# Patient Record
Sex: MALE | Race: WHITE | Employment: OTHER | URBAN - METROPOLITAN AREA
[De-identification: names, ages, dates, MRNs, and addresses within clinical notes are randomized per-mention and may not be internally consistent; named-entity substitution may affect disease eponyms.]

---

## 2017-01-12 ENCOUNTER — OFFICE VISIT (OUTPATIENT)
Dept: INTERNAL MEDICINE | Age: 67
End: 2017-01-12

## 2017-01-12 VITALS
HEART RATE: 76 BPM | BODY MASS INDEX: 35.89 KG/M2 | RESPIRATION RATE: 16 BRPM | SYSTOLIC BLOOD PRESSURE: 122 MMHG | WEIGHT: 272 LBS | DIASTOLIC BLOOD PRESSURE: 80 MMHG

## 2017-01-12 DIAGNOSIS — I10 ESSENTIAL HYPERTENSION: Primary | ICD-10-CM

## 2017-01-12 DIAGNOSIS — F41.1 GENERALIZED ANXIETY DISORDER: ICD-10-CM

## 2017-01-12 DIAGNOSIS — E11.51 TYPE 2 DIABETES MELLITUS WITH DIABETIC PERIPHERAL ANGIOPATHY WITHOUT GANGRENE, WITHOUT LONG-TERM CURRENT USE OF INSULIN (HCC): ICD-10-CM

## 2017-01-12 DIAGNOSIS — M51.26 LUMBAR HERNIATED DISC: ICD-10-CM

## 2017-01-12 DIAGNOSIS — E78.00 HYPERCHOLESTEROLEMIA: ICD-10-CM

## 2017-01-12 DIAGNOSIS — F33.41 RECURRENT MAJOR DEPRESSIVE DISORDER, IN PARTIAL REMISSION (HCC): ICD-10-CM

## 2017-01-12 LAB
A/G RATIO: 1.8 (ref 1.1–2.2)
ALBUMIN SERPL-MCNC: 4.2 G/DL (ref 3.4–5)
ALP BLD-CCNC: 120 U/L (ref 40–129)
ALT SERPL-CCNC: 9 U/L (ref 10–40)
ANION GAP SERPL CALCULATED.3IONS-SCNC: 17 MMOL/L (ref 3–16)
AST SERPL-CCNC: 8 U/L (ref 15–37)
BILIRUB SERPL-MCNC: <0.2 MG/DL (ref 0–1)
BUN BLDV-MCNC: 18 MG/DL (ref 7–20)
CALCIUM SERPL-MCNC: 9.3 MG/DL (ref 8.3–10.6)
CHLORIDE BLD-SCNC: 91 MMOL/L (ref 99–110)
CO2: 26 MMOL/L (ref 21–32)
CREAT SERPL-MCNC: 1.1 MG/DL (ref 0.8–1.3)
CREATININE URINE: 34.8 MG/DL (ref 39–259)
GFR AFRICAN AMERICAN: >60
GFR NON-AFRICAN AMERICAN: >60
GLOBULIN: 2.4 G/DL
GLUCOSE BLD-MCNC: 542 MG/DL (ref 70–99)
MICROALBUMIN UR-MCNC: <1.2 MG/DL
MICROALBUMIN/CREAT UR-RTO: ABNORMAL MG/G (ref 0–30)
POTASSIUM SERPL-SCNC: 4.4 MMOL/L (ref 3.5–5.1)
SODIUM BLD-SCNC: 134 MMOL/L (ref 136–145)
TOTAL PROTEIN: 6.6 G/DL (ref 6.4–8.2)

## 2017-01-12 PROCEDURE — 4040F PNEUMOC VAC/ADMIN/RCVD: CPT | Performed by: INTERNAL MEDICINE

## 2017-01-12 PROCEDURE — G8484 FLU IMMUNIZE NO ADMIN: HCPCS | Performed by: INTERNAL MEDICINE

## 2017-01-12 PROCEDURE — 3046F HEMOGLOBIN A1C LEVEL >9.0%: CPT | Performed by: INTERNAL MEDICINE

## 2017-01-12 PROCEDURE — 3017F COLORECTAL CA SCREEN DOC REV: CPT | Performed by: INTERNAL MEDICINE

## 2017-01-12 PROCEDURE — 99214 OFFICE O/P EST MOD 30 MIN: CPT | Performed by: INTERNAL MEDICINE

## 2017-01-12 PROCEDURE — 1123F ACP DISCUSS/DSCN MKR DOCD: CPT | Performed by: INTERNAL MEDICINE

## 2017-01-12 PROCEDURE — G8427 DOCREV CUR MEDS BY ELIG CLIN: HCPCS | Performed by: INTERNAL MEDICINE

## 2017-01-12 PROCEDURE — 36415 COLL VENOUS BLD VENIPUNCTURE: CPT | Performed by: INTERNAL MEDICINE

## 2017-01-12 PROCEDURE — 1036F TOBACCO NON-USER: CPT | Performed by: INTERNAL MEDICINE

## 2017-01-12 PROCEDURE — G8419 CALC BMI OUT NRM PARAM NOF/U: HCPCS | Performed by: INTERNAL MEDICINE

## 2017-01-12 ASSESSMENT — ENCOUNTER SYMPTOMS
GASTROINTESTINAL NEGATIVE: 1
RESPIRATORY NEGATIVE: 1
BACK PAIN: 1

## 2017-01-14 LAB
ESTIMATED AVERAGE GLUCOSE: 380.9 MG/DL
HBA1C MFR BLD: 14.9 %

## 2017-01-15 LAB
6-ACETYLMORPHINE: NOT DETECTED
7-AMINOCLONAZEPAM, URINE: <5 NG/ML
7-AMINOCLONAZEPAM: NOT DETECTED
ALPHA-HYDROXYALPRAZOLAM, URINE: <5 NG/ML
ALPHA-HYDROXYMIDAZOLAM, URINE: <20 NG/ML
ALPHA-OH-ALPRAZOLAM: NOT DETECTED
ALPRAZOLAM, URINE: <5 NG/ML
ALPRAZOLAM: NOT DETECTED
AMPHETAMINE: NOT DETECTED
BARBITURATES: NOT DETECTED
BENZOYLECGONINE: NOT DETECTED
BUPRENORPHINE: NOT DETECTED
CARISOPRODOL: NOT DETECTED
CHLORDIAZEPOXIDE, URINE: <20 NG/ML
CLONAZEPAM, URINE: <5 NG/ML
CLONAZEPAM: NOT DETECTED
CODEINE: NOT DETECTED
CREATININE URINE: 34.8 MG/DL (ref 20–400)
DIAZEPAM, URINE: <20 NG/ML
DIAZEPAM: NOT DETECTED
EER PAIN MGT DRUG PANEL, HIGH RES/EMIT U: NORMAL
ETHYL GLUCURONIDE: NOT DETECTED
FENTANYL: NOT DETECTED
HYDROCODONE: PRESENT
HYDROMORPHONE: NOT DETECTED
LORAZEPAM, URINE: <20 NG/ML
LORAZEPAM: NOT DETECTED
MARIJUANA METABOLITE: NOT DETECTED
MDA: NOT DETECTED
MDEA: NOT DETECTED
MDMA URINE: NOT DETECTED
MEPERIDINE: NOT DETECTED
METHADONE: NOT DETECTED
METHAMPHETAMINE: NOT DETECTED
METHYLPHENIDATE: NOT DETECTED
MIDAZOLAM, URINE: <20 NG/ML
MIDAZOLAM: NOT DETECTED
MORPHINE: PRESENT
NORBUPRENORPHINE, FREE: NOT DETECTED
NORDIAZEPAM, URINE: 281 NG/ML
NORDIAZEPAM: PRESENT
NORFENTANYL: NOT DETECTED
NORHYDROCODONE, URINE: PRESENT
NOROXYCODONE: NOT DETECTED
NOROXYMORPHONE, URINE: NOT DETECTED
OXAZEPAM, URINE: 670 NG/ML
OXAZEPAM: PRESENT
OXYCODONE: NOT DETECTED
OXYMORPHONE: NOT DETECTED
PAIN MANAGEMENT DRUG PANEL: NORMAL
PCP: NOT DETECTED
PHENTERMINE: NOT DETECTED
PROPOXYPHENE: NOT DETECTED
TAPENTADOL, URINE: NOT DETECTED
TAPENTADOL-O-SULFATE, URINE: NOT DETECTED
TEMAZEPAM, URINE: 516 NG/ML
TEMAZEPAM: PRESENT
TRAMADOL: NOT DETECTED
ZOLPIDEM: NOT DETECTED

## 2017-01-16 LAB
6AM URINE: <10 NG/ML
CODEINE, URINE: <20 NG/ML
HYDROCODONE, URINE: 567 NG/ML
HYDROMORPHONE, URINE: <20 NG/ML
MORPHINE URINE: <20 NG/ML
NORHYDROCODONE, URINE: 817 NG/ML
NOROXYCODONE, URINE: <20 NG/ML
NOROXYMORPHONE, URINE: <20 NG/ML
OXYCODONE, URINE CONFIRMATION: <20 NG/ML
OXYMORPHONE, URINE: <20 NG/ML

## 2017-01-24 RX ORDER — DIAZEPAM 10 MG/1
10 TABLET ORAL EVERY 6 HOURS PRN
Qty: 120 TABLET | Refills: 0 | Status: SHIPPED | OUTPATIENT
Start: 2017-01-24 | End: 2017-02-21 | Stop reason: SDUPTHER

## 2017-01-24 RX ORDER — MORPHINE SULFATE 15 MG/1
15 TABLET ORAL EVERY 8 HOURS PRN
Qty: 60 TABLET | Refills: 0 | Status: SHIPPED | OUTPATIENT
Start: 2017-01-24 | End: 2017-02-21 | Stop reason: SDUPTHER

## 2017-01-24 RX ORDER — HYDROCODONE BITARTRATE AND ACETAMINOPHEN 10; 325 MG/1; MG/1
1 TABLET ORAL EVERY 4 HOURS PRN
Qty: 180 TABLET | Refills: 0 | Status: SHIPPED | OUTPATIENT
Start: 2017-01-24 | End: 2017-02-21 | Stop reason: SDUPTHER

## 2017-02-21 RX ORDER — MORPHINE SULFATE 15 MG/1
15 TABLET ORAL EVERY 8 HOURS PRN
Qty: 60 TABLET | Refills: 0 | Status: SHIPPED | OUTPATIENT
Start: 2017-02-21 | End: 2017-03-22 | Stop reason: SDUPTHER

## 2017-02-21 RX ORDER — HYDROCODONE BITARTRATE AND ACETAMINOPHEN 10; 325 MG/1; MG/1
1 TABLET ORAL EVERY 4 HOURS PRN
Qty: 180 TABLET | Refills: 0 | Status: SHIPPED | OUTPATIENT
Start: 2017-02-21 | End: 2017-03-22 | Stop reason: SDUPTHER

## 2017-02-21 RX ORDER — DIAZEPAM 10 MG/1
10 TABLET ORAL EVERY 6 HOURS PRN
Qty: 120 TABLET | Refills: 0 | Status: SHIPPED | OUTPATIENT
Start: 2017-02-21 | End: 2017-03-22 | Stop reason: SDUPTHER

## 2017-03-22 ENCOUNTER — OFFICE VISIT (OUTPATIENT)
Dept: INTERNAL MEDICINE | Age: 67
End: 2017-03-22

## 2017-03-22 VITALS
HEART RATE: 84 BPM | WEIGHT: 266 LBS | SYSTOLIC BLOOD PRESSURE: 100 MMHG | BODY MASS INDEX: 35.09 KG/M2 | RESPIRATION RATE: 16 BRPM | DIASTOLIC BLOOD PRESSURE: 60 MMHG

## 2017-03-22 DIAGNOSIS — G63 POLYNEUROPATHY ASSOCIATED WITH UNDERLYING DISEASE (HCC): Primary | ICD-10-CM

## 2017-03-22 DIAGNOSIS — E11.51 TYPE 2 DIABETES MELLITUS WITH DIABETIC PERIPHERAL ANGIOPATHY WITHOUT GANGRENE, WITHOUT LONG-TERM CURRENT USE OF INSULIN (HCC): ICD-10-CM

## 2017-03-22 PROCEDURE — 4040F PNEUMOC VAC/ADMIN/RCVD: CPT | Performed by: INTERNAL MEDICINE

## 2017-03-22 PROCEDURE — G8417 CALC BMI ABV UP PARAM F/U: HCPCS | Performed by: INTERNAL MEDICINE

## 2017-03-22 PROCEDURE — 1036F TOBACCO NON-USER: CPT | Performed by: INTERNAL MEDICINE

## 2017-03-22 PROCEDURE — 3046F HEMOGLOBIN A1C LEVEL >9.0%: CPT | Performed by: INTERNAL MEDICINE

## 2017-03-22 PROCEDURE — 3017F COLORECTAL CA SCREEN DOC REV: CPT | Performed by: INTERNAL MEDICINE

## 2017-03-22 PROCEDURE — 1123F ACP DISCUSS/DSCN MKR DOCD: CPT | Performed by: INTERNAL MEDICINE

## 2017-03-22 PROCEDURE — G8427 DOCREV CUR MEDS BY ELIG CLIN: HCPCS | Performed by: INTERNAL MEDICINE

## 2017-03-22 PROCEDURE — G8484 FLU IMMUNIZE NO ADMIN: HCPCS | Performed by: INTERNAL MEDICINE

## 2017-03-22 PROCEDURE — 99214 OFFICE O/P EST MOD 30 MIN: CPT | Performed by: INTERNAL MEDICINE

## 2017-03-22 RX ORDER — HYDROCODONE BITARTRATE AND ACETAMINOPHEN 10; 325 MG/1; MG/1
1 TABLET ORAL EVERY 4 HOURS PRN
Qty: 180 TABLET | Refills: 0 | Status: SHIPPED | OUTPATIENT
Start: 2017-03-22 | End: 2017-04-24 | Stop reason: SDUPTHER

## 2017-03-22 RX ORDER — MORPHINE SULFATE 15 MG/1
15 TABLET ORAL EVERY 8 HOURS PRN
Qty: 60 TABLET | Refills: 0 | Status: SHIPPED | OUTPATIENT
Start: 2017-03-22 | End: 2017-04-24 | Stop reason: SDUPTHER

## 2017-03-22 RX ORDER — GABAPENTIN 300 MG/1
300 CAPSULE ORAL 3 TIMES DAILY
Qty: 90 CAPSULE | Refills: 3 | Status: SHIPPED | OUTPATIENT
Start: 2017-03-22 | End: 2017-07-08 | Stop reason: SDUPTHER

## 2017-03-22 RX ORDER — TRAZODONE HYDROCHLORIDE 100 MG/1
TABLET ORAL
Qty: 120 TABLET | Refills: 0 | Status: SHIPPED | OUTPATIENT
Start: 2017-03-22 | End: 2017-04-24 | Stop reason: SDUPTHER

## 2017-03-22 RX ORDER — DIAZEPAM 10 MG/1
10 TABLET ORAL EVERY 6 HOURS PRN
Qty: 120 TABLET | Refills: 0 | Status: SHIPPED | OUTPATIENT
Start: 2017-03-22 | End: 2017-04-24 | Stop reason: SDUPTHER

## 2017-03-22 ASSESSMENT — ENCOUNTER SYMPTOMS
BACK PAIN: 1
GASTROINTESTINAL NEGATIVE: 1
RESPIRATORY NEGATIVE: 1

## 2017-03-27 ENCOUNTER — TELEPHONE (OUTPATIENT)
Dept: INTERNAL MEDICINE | Age: 67
End: 2017-03-27

## 2017-04-25 ENCOUNTER — TELEPHONE (OUTPATIENT)
Dept: INTERNAL MEDICINE | Age: 67
End: 2017-04-25

## 2017-04-25 RX ORDER — MORPHINE SULFATE 15 MG/1
15 TABLET ORAL EVERY 8 HOURS PRN
Qty: 60 TABLET | Refills: 0 | Status: SHIPPED | OUTPATIENT
Start: 2017-04-25 | End: 2017-05-22 | Stop reason: SDUPTHER

## 2017-04-25 RX ORDER — TRAZODONE HYDROCHLORIDE 100 MG/1
TABLET ORAL
Qty: 120 TABLET | Refills: 0 | Status: SHIPPED | OUTPATIENT
Start: 2017-04-25 | End: 2017-06-08 | Stop reason: SDUPTHER

## 2017-04-25 RX ORDER — DIAZEPAM 10 MG/1
10 TABLET ORAL EVERY 6 HOURS PRN
Qty: 120 TABLET | Refills: 0 | Status: SHIPPED | OUTPATIENT
Start: 2017-04-25 | End: 2017-05-22 | Stop reason: SDUPTHER

## 2017-04-25 RX ORDER — HYDROCODONE BITARTRATE AND ACETAMINOPHEN 10; 325 MG/1; MG/1
1 TABLET ORAL EVERY 4 HOURS PRN
Qty: 180 TABLET | Refills: 0 | Status: SHIPPED | OUTPATIENT
Start: 2017-04-25 | End: 2017-05-22 | Stop reason: SDUPTHER

## 2017-05-08 ENCOUNTER — TELEPHONE (OUTPATIENT)
Dept: INTERNAL MEDICINE | Age: 67
End: 2017-05-08

## 2017-05-17 ENCOUNTER — OFFICE VISIT (OUTPATIENT)
Dept: INTERNAL MEDICINE | Age: 67
End: 2017-05-17

## 2017-05-17 VITALS
DIASTOLIC BLOOD PRESSURE: 58 MMHG | RESPIRATION RATE: 16 BRPM | WEIGHT: 269 LBS | BODY MASS INDEX: 35.49 KG/M2 | HEART RATE: 88 BPM | SYSTOLIC BLOOD PRESSURE: 102 MMHG

## 2017-05-17 DIAGNOSIS — R29.898 RIGHT LEG WEAKNESS: Primary | ICD-10-CM

## 2017-05-17 PROCEDURE — 4040F PNEUMOC VAC/ADMIN/RCVD: CPT | Performed by: INTERNAL MEDICINE

## 2017-05-17 PROCEDURE — 1036F TOBACCO NON-USER: CPT | Performed by: INTERNAL MEDICINE

## 2017-05-17 PROCEDURE — G8417 CALC BMI ABV UP PARAM F/U: HCPCS | Performed by: INTERNAL MEDICINE

## 2017-05-17 PROCEDURE — 3017F COLORECTAL CA SCREEN DOC REV: CPT | Performed by: INTERNAL MEDICINE

## 2017-05-17 PROCEDURE — 99214 OFFICE O/P EST MOD 30 MIN: CPT | Performed by: INTERNAL MEDICINE

## 2017-05-17 PROCEDURE — G8427 DOCREV CUR MEDS BY ELIG CLIN: HCPCS | Performed by: INTERNAL MEDICINE

## 2017-05-17 PROCEDURE — 1123F ACP DISCUSS/DSCN MKR DOCD: CPT | Performed by: INTERNAL MEDICINE

## 2017-05-17 RX ORDER — ALPRAZOLAM 1 MG/1
TABLET ORAL
Qty: 3 TABLET | Refills: 0 | Status: SHIPPED | OUTPATIENT
Start: 2017-05-17 | End: 2017-05-23 | Stop reason: SDUPTHER

## 2017-05-17 ASSESSMENT — ENCOUNTER SYMPTOMS
RESPIRATORY NEGATIVE: 1
BACK PAIN: 1
GASTROINTESTINAL NEGATIVE: 1

## 2017-05-22 ENCOUNTER — TELEPHONE (OUTPATIENT)
Dept: INTERNAL MEDICINE | Age: 67
End: 2017-05-22

## 2017-05-23 RX ORDER — MORPHINE SULFATE 15 MG/1
15 TABLET ORAL EVERY 8 HOURS PRN
Qty: 60 TABLET | Refills: 0 | Status: SHIPPED | OUTPATIENT
Start: 2017-05-23 | End: 2017-06-21 | Stop reason: SDUPTHER

## 2017-05-23 RX ORDER — ALPRAZOLAM 1 MG/1
TABLET ORAL
Qty: 3 TABLET | Refills: 0 | OUTPATIENT
Start: 2017-05-23 | End: 2017-07-06 | Stop reason: ALTCHOICE

## 2017-05-23 RX ORDER — HYDROCODONE BITARTRATE AND ACETAMINOPHEN 10; 325 MG/1; MG/1
1 TABLET ORAL EVERY 4 HOURS PRN
Qty: 180 TABLET | Refills: 0 | Status: SHIPPED | OUTPATIENT
Start: 2017-05-23 | End: 2017-06-21 | Stop reason: SDUPTHER

## 2017-05-23 RX ORDER — DIAZEPAM 10 MG/1
10 TABLET ORAL EVERY 6 HOURS PRN
Qty: 120 TABLET | Refills: 0 | Status: SHIPPED | OUTPATIENT
Start: 2017-05-23 | End: 2017-06-21 | Stop reason: SDUPTHER

## 2017-06-06 ENCOUNTER — OFFICE VISIT (OUTPATIENT)
Dept: INTERNAL MEDICINE | Age: 67
End: 2017-06-06

## 2017-06-06 VITALS
RESPIRATION RATE: 18 BRPM | DIASTOLIC BLOOD PRESSURE: 80 MMHG | BODY MASS INDEX: 34.7 KG/M2 | SYSTOLIC BLOOD PRESSURE: 138 MMHG | HEART RATE: 76 BPM | WEIGHT: 263 LBS

## 2017-06-06 DIAGNOSIS — R29.898 RIGHT LEG WEAKNESS: ICD-10-CM

## 2017-06-06 DIAGNOSIS — E78.00 HYPERCHOLESTEROLEMIA: ICD-10-CM

## 2017-06-06 DIAGNOSIS — E11.51 TYPE 2 DIABETES MELLITUS WITH DIABETIC PERIPHERAL ANGIOPATHY WITHOUT GANGRENE, WITHOUT LONG-TERM CURRENT USE OF INSULIN (HCC): Primary | ICD-10-CM

## 2017-06-06 PROCEDURE — 3046F HEMOGLOBIN A1C LEVEL >9.0%: CPT | Performed by: INTERNAL MEDICINE

## 2017-06-06 PROCEDURE — 99214 OFFICE O/P EST MOD 30 MIN: CPT | Performed by: INTERNAL MEDICINE

## 2017-06-06 PROCEDURE — 1036F TOBACCO NON-USER: CPT | Performed by: INTERNAL MEDICINE

## 2017-06-06 PROCEDURE — G8427 DOCREV CUR MEDS BY ELIG CLIN: HCPCS | Performed by: INTERNAL MEDICINE

## 2017-06-06 PROCEDURE — 4040F PNEUMOC VAC/ADMIN/RCVD: CPT | Performed by: INTERNAL MEDICINE

## 2017-06-06 PROCEDURE — 3017F COLORECTAL CA SCREEN DOC REV: CPT | Performed by: INTERNAL MEDICINE

## 2017-06-06 PROCEDURE — G8417 CALC BMI ABV UP PARAM F/U: HCPCS | Performed by: INTERNAL MEDICINE

## 2017-06-06 PROCEDURE — 1123F ACP DISCUSS/DSCN MKR DOCD: CPT | Performed by: INTERNAL MEDICINE

## 2017-06-06 RX ORDER — ROSUVASTATIN CALCIUM 20 MG/1
20 TABLET, COATED ORAL DAILY
Qty: 30 TABLET | Refills: 3 | Status: SHIPPED | OUTPATIENT
Start: 2017-06-06 | End: 2017-06-08 | Stop reason: ALTCHOICE

## 2017-06-06 ASSESSMENT — ENCOUNTER SYMPTOMS
RESPIRATORY NEGATIVE: 1
GASTROINTESTINAL NEGATIVE: 1
BACK PAIN: 1

## 2017-06-08 RX ORDER — LISINOPRIL AND HYDROCHLOROTHIAZIDE 20; 12.5 MG/1; MG/1
TABLET ORAL
Qty: 60 TABLET | Refills: 5 | Status: SHIPPED | OUTPATIENT
Start: 2017-06-08 | End: 2017-07-06 | Stop reason: SDUPTHER

## 2017-06-08 RX ORDER — CLONIDINE HYDROCHLORIDE 0.1 MG/1
TABLET ORAL
Qty: 60 TABLET | Refills: 5 | Status: ON HOLD | OUTPATIENT
Start: 2017-06-08 | End: 2017-10-21 | Stop reason: HOSPADM

## 2017-06-08 RX ORDER — AMOXICILLIN 500 MG/1
CAPSULE ORAL
Qty: 60 CAPSULE | Refills: 5 | Status: SHIPPED | OUTPATIENT
Start: 2017-06-08 | End: 2017-11-21 | Stop reason: SDUPTHER

## 2017-06-09 ENCOUNTER — TELEPHONE (OUTPATIENT)
Dept: INTERNAL MEDICINE | Age: 67
End: 2017-06-09

## 2017-06-09 DIAGNOSIS — M50.20 HERNIATED CERVICAL DISC: ICD-10-CM

## 2017-06-09 DIAGNOSIS — M51.26 LUMBAR HERNIATED DISC: Primary | ICD-10-CM

## 2017-06-12 ENCOUNTER — TELEPHONE (OUTPATIENT)
Dept: INTERNAL MEDICINE | Age: 67
End: 2017-06-12

## 2017-06-20 ENCOUNTER — HOSPITAL ENCOUNTER (OUTPATIENT)
Dept: PHYSICAL THERAPY | Age: 67
Discharge: HOME | End: 2017-06-30
Admitting: INTERNAL MEDICINE

## 2017-06-23 RX ORDER — MORPHINE SULFATE 15 MG/1
15 TABLET ORAL EVERY 8 HOURS PRN
Qty: 60 TABLET | Refills: 0 | Status: SHIPPED | OUTPATIENT
Start: 2017-06-23 | End: 2017-07-24 | Stop reason: SDUPTHER

## 2017-06-23 RX ORDER — DIAZEPAM 10 MG/1
10 TABLET ORAL EVERY 6 HOURS PRN
Qty: 120 TABLET | Refills: 0 | Status: SHIPPED | OUTPATIENT
Start: 2017-06-23 | End: 2017-07-24 | Stop reason: SDUPTHER

## 2017-06-23 RX ORDER — HYDROCODONE BITARTRATE AND ACETAMINOPHEN 10; 325 MG/1; MG/1
1 TABLET ORAL EVERY 4 HOURS PRN
Qty: 180 TABLET | Refills: 0 | Status: SHIPPED | OUTPATIENT
Start: 2017-06-23 | End: 2017-07-24 | Stop reason: SDUPTHER

## 2017-06-23 RX ORDER — TRAZODONE HYDROCHLORIDE 100 MG/1
TABLET ORAL
Qty: 120 TABLET | Refills: 5 | Status: SHIPPED | OUTPATIENT
Start: 2017-06-23 | End: 2017-07-24 | Stop reason: SDUPTHER

## 2017-07-06 ENCOUNTER — OFFICE VISIT (OUTPATIENT)
Dept: INTERNAL MEDICINE | Age: 67
End: 2017-07-06

## 2017-07-06 VITALS
WEIGHT: 257 LBS | RESPIRATION RATE: 16 BRPM | SYSTOLIC BLOOD PRESSURE: 122 MMHG | HEART RATE: 72 BPM | DIASTOLIC BLOOD PRESSURE: 68 MMHG | BODY MASS INDEX: 33.91 KG/M2

## 2017-07-06 DIAGNOSIS — E11.51 TYPE 2 DIABETES MELLITUS WITH DIABETIC PERIPHERAL ANGIOPATHY WITHOUT GANGRENE, WITHOUT LONG-TERM CURRENT USE OF INSULIN (HCC): ICD-10-CM

## 2017-07-06 DIAGNOSIS — E78.00 HYPERCHOLESTEROLEMIA: Primary | ICD-10-CM

## 2017-07-06 DIAGNOSIS — R29.898 RIGHT LEG WEAKNESS: ICD-10-CM

## 2017-07-06 LAB
A/G RATIO: 1.7 (ref 1.1–2.2)
ALBUMIN SERPL-MCNC: 4.2 G/DL (ref 3.4–5)
ALP BLD-CCNC: 70 U/L (ref 40–129)
ALT SERPL-CCNC: 13 U/L (ref 10–40)
ANION GAP SERPL CALCULATED.3IONS-SCNC: 16 MMOL/L (ref 3–16)
AST SERPL-CCNC: 10 U/L (ref 15–37)
BILIRUB SERPL-MCNC: 0.3 MG/DL (ref 0–1)
BUN BLDV-MCNC: 16 MG/DL (ref 7–20)
CALCIUM SERPL-MCNC: 9.9 MG/DL (ref 8.3–10.6)
CHLORIDE BLD-SCNC: 101 MMOL/L (ref 99–110)
CHOLESTEROL, TOTAL: 176 MG/DL (ref 0–199)
CO2: 27 MMOL/L (ref 21–32)
CREAT SERPL-MCNC: 0.8 MG/DL (ref 0.8–1.3)
GFR AFRICAN AMERICAN: >60
GFR NON-AFRICAN AMERICAN: >60
GLOBULIN: 2.5 G/DL
GLUCOSE BLD-MCNC: 144 MG/DL (ref 70–99)
HDLC SERPL-MCNC: 32 MG/DL (ref 40–60)
LDL CHOLESTEROL CALCULATED: 94 MG/DL
POTASSIUM SERPL-SCNC: 4.1 MMOL/L (ref 3.5–5.1)
SODIUM BLD-SCNC: 144 MMOL/L (ref 136–145)
TOTAL PROTEIN: 6.7 G/DL (ref 6.4–8.2)
TRIGL SERPL-MCNC: 250 MG/DL (ref 0–150)
VLDLC SERPL CALC-MCNC: 50 MG/DL

## 2017-07-06 PROCEDURE — G8427 DOCREV CUR MEDS BY ELIG CLIN: HCPCS | Performed by: INTERNAL MEDICINE

## 2017-07-06 PROCEDURE — 99214 OFFICE O/P EST MOD 30 MIN: CPT | Performed by: INTERNAL MEDICINE

## 2017-07-06 PROCEDURE — 3017F COLORECTAL CA SCREEN DOC REV: CPT | Performed by: INTERNAL MEDICINE

## 2017-07-06 PROCEDURE — 4040F PNEUMOC VAC/ADMIN/RCVD: CPT | Performed by: INTERNAL MEDICINE

## 2017-07-06 PROCEDURE — 1123F ACP DISCUSS/DSCN MKR DOCD: CPT | Performed by: INTERNAL MEDICINE

## 2017-07-06 PROCEDURE — 1036F TOBACCO NON-USER: CPT | Performed by: INTERNAL MEDICINE

## 2017-07-06 PROCEDURE — 3046F HEMOGLOBIN A1C LEVEL >9.0%: CPT | Performed by: INTERNAL MEDICINE

## 2017-07-06 PROCEDURE — 36415 COLL VENOUS BLD VENIPUNCTURE: CPT | Performed by: INTERNAL MEDICINE

## 2017-07-06 PROCEDURE — G8417 CALC BMI ABV UP PARAM F/U: HCPCS | Performed by: INTERNAL MEDICINE

## 2017-07-06 ASSESSMENT — ENCOUNTER SYMPTOMS
BACK PAIN: 1
GASTROINTESTINAL NEGATIVE: 1
RESPIRATORY NEGATIVE: 1

## 2017-07-07 ENCOUNTER — TELEPHONE (OUTPATIENT)
Dept: INTERNAL MEDICINE | Age: 67
End: 2017-07-07

## 2017-07-07 LAB
ESTIMATED AVERAGE GLUCOSE: 197.3 MG/DL
HBA1C MFR BLD: 8.5 %

## 2017-07-18 RX ORDER — MORPHINE SULFATE 15 MG/1
15 TABLET ORAL EVERY 8 HOURS PRN
Qty: 60 TABLET | Refills: 0 | OUTPATIENT
Start: 2017-07-18

## 2017-07-18 RX ORDER — DIAZEPAM 10 MG/1
10 TABLET ORAL EVERY 6 HOURS PRN
Qty: 120 TABLET | Refills: 0 | OUTPATIENT
Start: 2017-07-18

## 2017-07-18 RX ORDER — TRAZODONE HYDROCHLORIDE 100 MG/1
TABLET ORAL
Qty: 120 TABLET | Refills: 0 | OUTPATIENT
Start: 2017-07-18

## 2017-07-18 RX ORDER — HYDROCODONE BITARTRATE AND ACETAMINOPHEN 10; 325 MG/1; MG/1
1 TABLET ORAL EVERY 4 HOURS PRN
Qty: 180 TABLET | Refills: 0 | OUTPATIENT
Start: 2017-07-18

## 2017-07-24 DIAGNOSIS — M51.26 LUMBAR HERNIATED DISC: Primary | ICD-10-CM

## 2017-07-24 DIAGNOSIS — M50.20 HERNIATED CERVICAL DISC: ICD-10-CM

## 2017-07-24 RX ORDER — TRAZODONE HYDROCHLORIDE 100 MG/1
TABLET ORAL
Qty: 120 TABLET | Refills: 1 | Status: ON HOLD | OUTPATIENT
Start: 2017-07-24 | End: 2017-10-21 | Stop reason: HOSPADM

## 2017-07-24 RX ORDER — MORPHINE SULFATE 15 MG/1
15 TABLET ORAL EVERY 8 HOURS PRN
Qty: 60 TABLET | Refills: 0 | Status: SHIPPED | OUTPATIENT
Start: 2017-07-24 | End: 2017-08-17 | Stop reason: DRUGHIGH

## 2017-07-24 RX ORDER — HYDROCODONE BITARTRATE AND ACETAMINOPHEN 10; 325 MG/1; MG/1
1 TABLET ORAL EVERY 4 HOURS PRN
Qty: 180 TABLET | Refills: 0 | Status: SHIPPED | OUTPATIENT
Start: 2017-07-24 | End: 2017-08-17 | Stop reason: DRUGHIGH

## 2017-07-24 RX ORDER — DIAZEPAM 10 MG/1
10 TABLET ORAL EVERY 6 HOURS PRN
Qty: 120 TABLET | Refills: 0 | Status: ON HOLD | OUTPATIENT
Start: 2017-07-24 | End: 2017-08-30 | Stop reason: SDUPTHER

## 2017-08-07 ENCOUNTER — TELEPHONE (OUTPATIENT)
Dept: PAIN MANAGEMENT | Age: 67
End: 2017-08-07

## 2017-08-09 ENCOUNTER — TELEPHONE (OUTPATIENT)
Dept: INTERNAL MEDICINE | Age: 67
End: 2017-08-09

## 2017-08-17 ENCOUNTER — TELEPHONE (OUTPATIENT)
Dept: INTERNAL MEDICINE | Age: 67
End: 2017-08-17

## 2017-08-17 RX ORDER — HYDROCODONE BITARTRATE AND ACETAMINOPHEN 10; 325 MG/1; MG/1
1 TABLET ORAL EVERY 4 HOURS PRN
Status: ON HOLD | COMMUNITY
End: 2017-10-21

## 2017-08-17 RX ORDER — MORPHINE SULFATE 15 MG/1
15 TABLET ORAL EVERY 12 HOURS PRN
Status: ON HOLD | COMMUNITY
End: 2017-10-21

## 2017-08-30 PROBLEM — M54.9 BACK PAIN: Status: ACTIVE | Noted: 2017-08-30

## 2017-08-30 PROBLEM — E11.9 DM (DIABETES MELLITUS) (HCC): Status: ACTIVE | Noted: 2017-08-30

## 2017-08-31 PROBLEM — R55 NEAR SYNCOPE: Status: ACTIVE | Noted: 2017-08-31

## 2017-08-31 PROBLEM — R77.8 ELEVATED TROPONIN I LEVEL: Status: ACTIVE | Noted: 2017-08-31

## 2017-09-02 ENCOUNTER — CARE COORDINATION (OUTPATIENT)
Dept: CASE MANAGEMENT | Age: 67
End: 2017-09-02

## 2017-09-05 ENCOUNTER — TELEPHONE (OUTPATIENT)
Dept: INTERNAL MEDICINE | Age: 67
End: 2017-09-05

## 2017-09-05 RX ORDER — DIAZEPAM 10 MG/1
TABLET ORAL
Qty: 60 TABLET | Refills: 0 | OUTPATIENT
Start: 2017-09-05 | End: 2017-09-19 | Stop reason: SDUPTHER

## 2017-09-08 ENCOUNTER — CARE COORDINATION (OUTPATIENT)
Dept: CASE MANAGEMENT | Age: 67
End: 2017-09-08

## 2017-09-19 ENCOUNTER — OFFICE VISIT (OUTPATIENT)
Dept: INTERNAL MEDICINE | Age: 67
End: 2017-09-19

## 2017-09-19 VITALS
OXYGEN SATURATION: 98 % | BODY MASS INDEX: 36.46 KG/M2 | DIASTOLIC BLOOD PRESSURE: 68 MMHG | HEART RATE: 76 BPM | HEIGHT: 72 IN | SYSTOLIC BLOOD PRESSURE: 124 MMHG | WEIGHT: 269.2 LBS

## 2017-09-19 DIAGNOSIS — Z12.12 SCREENING FOR COLORECTAL CANCER: ICD-10-CM

## 2017-09-19 DIAGNOSIS — I48.91 ATRIAL FIBRILLATION WITH RVR (HCC): ICD-10-CM

## 2017-09-19 DIAGNOSIS — I10 ESSENTIAL HYPERTENSION: ICD-10-CM

## 2017-09-19 DIAGNOSIS — Z12.5 SCREENING FOR PROSTATE CANCER: ICD-10-CM

## 2017-09-19 DIAGNOSIS — I67.82 TEMPORARY CEREBRAL VASCULAR DYSFUNCTION: ICD-10-CM

## 2017-09-19 DIAGNOSIS — F41.1 GENERALIZED ANXIETY DISORDER: ICD-10-CM

## 2017-09-19 DIAGNOSIS — Z00.00 ROUTINE GENERAL MEDICAL EXAMINATION AT A HEALTH CARE FACILITY: ICD-10-CM

## 2017-09-19 DIAGNOSIS — Z13.1 SCREENING FOR DIABETES MELLITUS (DM): ICD-10-CM

## 2017-09-19 DIAGNOSIS — F33.41 RECURRENT MAJOR DEPRESSIVE DISORDER, IN PARTIAL REMISSION (HCC): ICD-10-CM

## 2017-09-19 DIAGNOSIS — E78.00 HYPERCHOLESTEROLEMIA: ICD-10-CM

## 2017-09-19 DIAGNOSIS — E11.9 TYPE 2 DIABETES MELLITUS WITHOUT COMPLICATION, WITHOUT LONG-TERM CURRENT USE OF INSULIN (HCC): ICD-10-CM

## 2017-09-19 DIAGNOSIS — E11.51 TYPE 2 DIABETES MELLITUS WITH DIABETIC PERIPHERAL ANGIOPATHY WITHOUT GANGRENE, WITHOUT LONG-TERM CURRENT USE OF INSULIN (HCC): ICD-10-CM

## 2017-09-19 DIAGNOSIS — M54.5 CHRONIC BILATERAL LOW BACK PAIN, WITH SCIATICA PRESENCE UNSPECIFIED: ICD-10-CM

## 2017-09-19 DIAGNOSIS — I25.10 CORONARY ARTERY DISEASE INVOLVING NATIVE CORONARY ARTERY OF NATIVE HEART WITHOUT ANGINA PECTORIS: ICD-10-CM

## 2017-09-19 DIAGNOSIS — Z00.00 ROUTINE GENERAL MEDICAL EXAMINATION AT A HEALTH CARE FACILITY: Primary | ICD-10-CM

## 2017-09-19 DIAGNOSIS — G89.29 CHRONIC BILATERAL LOW BACK PAIN, WITH SCIATICA PRESENCE UNSPECIFIED: ICD-10-CM

## 2017-09-19 DIAGNOSIS — Z13.6 SCREENING FOR CARDIOVASCULAR CONDITION: ICD-10-CM

## 2017-09-19 DIAGNOSIS — Z72.89 OTHER PROBLEMS RELATED TO LIFESTYLE: ICD-10-CM

## 2017-09-19 DIAGNOSIS — Z12.11 SCREENING FOR COLORECTAL CANCER: ICD-10-CM

## 2017-09-19 PROBLEM — M54.9 BACK PAIN: Status: RESOLVED | Noted: 2017-08-30 | Resolved: 2017-09-19

## 2017-09-19 PROBLEM — R77.8 ELEVATED TROPONIN: Status: RESOLVED | Noted: 2017-08-31 | Resolved: 2017-09-19

## 2017-09-19 LAB
A/G RATIO: 1.8 (ref 1.1–2.2)
ALBUMIN SERPL-MCNC: 4.2 G/DL (ref 3.4–5)
ALP BLD-CCNC: 78 U/L (ref 40–129)
ALT SERPL-CCNC: 16 U/L (ref 10–40)
ANION GAP SERPL CALCULATED.3IONS-SCNC: 12 MMOL/L (ref 3–16)
AST SERPL-CCNC: 11 U/L (ref 15–37)
BILIRUB SERPL-MCNC: <0.2 MG/DL (ref 0–1)
BILIRUBIN URINE: NEGATIVE
BLOOD, URINE: NEGATIVE
BUN BLDV-MCNC: 16 MG/DL (ref 7–20)
CALCIUM SERPL-MCNC: 10.1 MG/DL (ref 8.3–10.6)
CHLORIDE BLD-SCNC: 100 MMOL/L (ref 99–110)
CLARITY: CLEAR
CO2: 30 MMOL/L (ref 21–32)
COLOR: YELLOW
CREAT SERPL-MCNC: 0.9 MG/DL (ref 0.8–1.3)
CREATININE URINE: 123.8 MG/DL (ref 39–259)
DIABETIC RETINOPATHY: NORMAL
EPITHELIAL CELLS, UA: 1 /HPF (ref 0–5)
GFR AFRICAN AMERICAN: >60
GFR NON-AFRICAN AMERICAN: >60
GLOBULIN: 2.4 G/DL
GLUCOSE BLD-MCNC: 128 MG/DL (ref 70–99)
GLUCOSE URINE: NEGATIVE MG/DL
HEPATITIS C ANTIBODY INTERPRETATION: NORMAL
HYALINE CASTS: 1 /LPF (ref 0–8)
KETONES, URINE: NEGATIVE MG/DL
LEUKOCYTE ESTERASE, URINE: NEGATIVE
MICROALBUMIN UR-MCNC: <1.2 MG/DL
MICROALBUMIN/CREAT UR-RTO: NORMAL MG/G (ref 0–30)
MICROSCOPIC EXAMINATION: NORMAL
NITRITE, URINE: NEGATIVE
PH UA: 5.5
POTASSIUM SERPL-SCNC: 4.6 MMOL/L (ref 3.5–5.1)
PROSTATE SPECIFIC ANTIGEN: 0.53 NG/ML (ref 0–4)
PROTEIN UA: NEGATIVE MG/DL
RBC UA: 1 /HPF (ref 0–4)
SODIUM BLD-SCNC: 142 MMOL/L (ref 136–145)
SPECIFIC GRAVITY UA: 1.02
TOTAL PROTEIN: 6.6 G/DL (ref 6.4–8.2)
UROBILINOGEN, URINE: 0.2 E.U./DL
WBC UA: 2 /HPF (ref 0–5)

## 2017-09-19 PROCEDURE — G8417 CALC BMI ABV UP PARAM F/U: HCPCS | Performed by: INTERNAL MEDICINE

## 2017-09-19 PROCEDURE — 1123F ACP DISCUSS/DSCN MKR DOCD: CPT | Performed by: INTERNAL MEDICINE

## 2017-09-19 PROCEDURE — 3017F COLORECTAL CA SCREEN DOC REV: CPT | Performed by: INTERNAL MEDICINE

## 2017-09-19 PROCEDURE — G0439 PPPS, SUBSEQ VISIT: HCPCS | Performed by: INTERNAL MEDICINE

## 2017-09-19 PROCEDURE — G8427 DOCREV CUR MEDS BY ELIG CLIN: HCPCS | Performed by: INTERNAL MEDICINE

## 2017-09-19 PROCEDURE — 1111F DSCHRG MED/CURRENT MED MERGE: CPT | Performed by: INTERNAL MEDICINE

## 2017-09-19 PROCEDURE — 99213 OFFICE O/P EST LOW 20 MIN: CPT | Performed by: INTERNAL MEDICINE

## 2017-09-19 PROCEDURE — 1036F TOBACCO NON-USER: CPT | Performed by: INTERNAL MEDICINE

## 2017-09-19 PROCEDURE — 4040F PNEUMOC VAC/ADMIN/RCVD: CPT | Performed by: INTERNAL MEDICINE

## 2017-09-19 PROCEDURE — G8598 ASA/ANTIPLAT THER USED: HCPCS | Performed by: INTERNAL MEDICINE

## 2017-09-19 PROCEDURE — 3046F HEMOGLOBIN A1C LEVEL >9.0%: CPT | Performed by: INTERNAL MEDICINE

## 2017-09-19 RX ORDER — DIAZEPAM 10 MG/1
TABLET ORAL
Qty: 60 TABLET | Refills: 0 | Status: SHIPPED | OUTPATIENT
Start: 2017-09-19 | End: 2017-09-27 | Stop reason: SDUPTHER

## 2017-09-19 ASSESSMENT — ENCOUNTER SYMPTOMS
VOICE CHANGE: 0
CHEST TIGHTNESS: 0
BLOOD IN STOOL: 0
SHORTNESS OF BREATH: 0
DIARRHEA: 0
ANAL BLEEDING: 0
EYE PAIN: 0
EYE REDNESS: 0
APNEA: 0
WHEEZING: 0
COLOR CHANGE: 0
BACK PAIN: 0
SINUS PRESSURE: 0
NAUSEA: 0
ABDOMINAL PAIN: 0
PHOTOPHOBIA: 0
ABDOMINAL DISTENTION: 0
CHOKING: 0
TROUBLE SWALLOWING: 0
RHINORRHEA: 0
RECTAL PAIN: 0
CONSTIPATION: 0
EYE ITCHING: 0
EYE DISCHARGE: 0
COUGH: 0
VOMITING: 0
STRIDOR: 0
SORE THROAT: 0
FACIAL SWELLING: 0

## 2017-09-20 LAB
ESTIMATED AVERAGE GLUCOSE: 148.5 MG/DL
HBA1C MFR BLD: 6.8 %

## 2017-09-21 LAB — VITAMIN D 1,25-DIHYDROXY: 27.8 PG/ML (ref 19.9–79.3)

## 2017-09-27 ENCOUNTER — TELEPHONE (OUTPATIENT)
Dept: INTERNAL MEDICINE | Age: 67
End: 2017-09-27

## 2017-09-28 RX ORDER — DIAZEPAM 10 MG/1
TABLET ORAL
Qty: 120 TABLET | Refills: 0 | Status: SHIPPED | OUTPATIENT
Start: 2017-09-28 | End: 2017-10-02 | Stop reason: SDUPTHER

## 2017-09-28 RX ORDER — SENNOSIDES 8.6 MG
1 TABLET ORAL DAILY
Qty: 30 TABLET | Refills: 0 | Status: SHIPPED | OUTPATIENT
Start: 2017-09-28 | End: 2017-11-06 | Stop reason: SDUPTHER

## 2017-10-02 ENCOUNTER — TELEPHONE (OUTPATIENT)
Dept: CARDIOLOGY CLINIC | Age: 67
End: 2017-10-02

## 2017-10-02 RX ORDER — DIAZEPAM 10 MG/1
TABLET ORAL
Qty: 120 TABLET | Refills: 0 | Status: ON HOLD | OUTPATIENT
Start: 2017-10-02 | End: 2017-10-21

## 2017-10-02 RX ORDER — ATORVASTATIN CALCIUM 40 MG/1
40 TABLET, FILM COATED ORAL DAILY
Qty: 30 TABLET | Refills: 5 | Status: SHIPPED | OUTPATIENT
Start: 2017-10-02 | End: 2018-09-06 | Stop reason: SDUPTHER

## 2017-10-02 NOTE — TELEPHONE ENCOUNTER
Meron ESTEVEZ .    Patient had appt with you on 10/27/17 as hsfu. He cancelled that appointment stating he feels great. Dr. Kelsea Romero saw him in hospital on 8/31/17 for a fib with RVR which converted in ER with Cardizem. Dr. Kelsea Romero wanted him to start Coumadin but patient states Dr. Lucia Banerjee (?) stated he did not need that so patient did not take it. Dr. Kelsea Romero also started him on Toprol XL 25 mg in hospital.  Patient states the medication is working great and he feels \"really good\". He stated he would call you if he began feeling poorly again. Do you want to see patient as scheduled? Or is he ok to see you on as needed basis? He underwent LHC with Dr. Rad Ray on 9/1/17  (Procedures: LHC, CA, LVG - LM <20% LAD 50% prox, Cx 30% mid, RCA 20%, LVEF 60%).

## 2017-10-02 NOTE — TELEPHONE ENCOUNTER
Pt called stating he cancelled his hsfu with NPBB, thinking he didn't need the appointment. Pt is asking to speak with NPBB in regards to a medication that he was started on. I would imagine pt needs to reschedule his cancelled appt with [facility]? Please advise or  please return call to [phone].

## 2017-10-03 NOTE — TELEPHONE ENCOUNTER
JUSTYN:   I Spoke with pt, he is adamant about remaining on his current regiment. I spoke with him at length regarding the importance of anticoagulation in the setting of afib and possible silent afib along with his risk factors. He states he was getting conflicting information from different doctors about what his issue was while in the hospital. He is comfortable with what he is doing at this time. He is taking Aspirin 325 mg, Toprol 25 mg and Lipitor. He does not feel a follow up appointment is going to change his opinion about this. He also can not afford constant appointments and INR checks. He is going to continue what he is doing, and will call if he changes his mind or has further questions.

## 2017-10-11 PROBLEM — R41.0 DELIRIUM: Status: ACTIVE | Noted: 2017-10-11

## 2017-10-11 PROBLEM — I48.0 PAROXYSMAL A-FIB (HCC): Status: ACTIVE | Noted: 2017-10-11

## 2017-10-15 ENCOUNTER — CARE COORDINATION (OUTPATIENT)
Dept: CASE MANAGEMENT | Age: 67
End: 2017-10-15

## 2017-10-15 NOTE — CARE COORDINATION
[facility] Transitions Initial Follow Up Call    Call within 2 business days of discharge: Yes    Patient: Yadira Hammer Patient : 1950   MRN: [id number]  Reason for Admission: Acute Encephalopathy  Discharge Date: 10/14/17 RARS: [facility]er Risk Score: 13.5 CMRS 6     [facility]: [facility]    Attempted to reach patient for 24hr post hospital transition call, no answer. VM left stating purpose of call along with my contact information requesting a return call. Spoke with Ashlie Avendano at [facility] who stated they spoke with patient on 10/14 and he requested start of care 10/16/17.     Ardian Orr, RN  Care Transitions Coordinator  [phone]          Follow Up  Future Appointments  Date Time Provider Pierre Vázquez   2017 11:00 AM [facility]       Adrian Orr, RN

## 2017-10-16 ENCOUNTER — TELEPHONE (OUTPATIENT)
Dept: PHARMACY | Facility: CLINIC | Age: 67
End: 2017-10-16

## 2017-10-16 NOTE — TELEPHONE ENCOUNTER
MG tablet  · Updated home medication list - called pharmacy for clarification on directions for use. Rx is written for q12h prn. Take 15 mg by mouth every 12 hours as needed for Pain  .  traZODone (DESYREL) 100 MG tablet  · Pt states that he takes 0.5 tablets at bedtime only. Updated home medication list.  TAKE ONE-HALF TABLET BY MOUTH TWICE DAILY AND THREE AT BEDTIME (Patient taking differently: Take 50 mg by mouth nightly TAKE ONE-HALF TABLET BY MOUTH TWICE DAILY AND THREE AT BEDTIME)    cloNIDine (CATAPRES) 0.1 MG tablet TAKE ONE TABLET BY MOUTH TWICE DAILY    amoxicillin (AMOXIL) 500 MG capsule  · Pt states he is taking this chronically for acne. TAKE ONE CAPSULE BY MOUTH TWICE DAILY    metFORMIN (GLUCOPHAGE) 1000 MG tablet  · Pt does not monitor BG at home. Take 1 tablet by mouth 2 times daily (with meals)    lisinopril-hydrochlorothiazide (PRINZIDE;ZESTORETIC) 20-12.5 MG per tablet  · Pt does not monitor BP at home. Take 2 tablets by mouth daily      These are the medications you have told us you were taking at home, STOP taking them after you leave the hospital   · Gabapentin - patient is no longer taking this medication. Meds marked as reviewed. Meds to Beds:No    Estimated Creatinine Clearance: 68 mL/min (based on SCr of 1.4 mg/dL). Assessment/Plan:  - Medication reconciliation completed. Number of medications reviewed: 14    - Pt is taking medications as directed by discharging physician. Number of discrepancies: 4. Instructions per discharge list provided except per below documentation. Identified medication discrepancies/issues:   · Category 1 (0)  · Category 2 (0)  · Category 3 (1):  1. Potassium - patient has not filled new prescription yet. Patient states he will have his son pick this up today. Patient states that he has the printed prescription at home. · Category 4 (3):  1. Norco - patient's prescription is written for 1 tablet q4-6h prn NTE 5 tablets/day.  Updated home medication list to reflect correct dosing schedule. 2. Morphine - patient's prescription is written for 1 tablet q12h prn pain. Updated home medication list to reflect correct dosing schedule. 3. Trazodone - patient reports that he only takes 50 mg (1/2 tablet) at bedtime only. Updated home medication list.     - [facility] active medication list updated:  · Medications Added (0)  · Medications Removed (0)  · Medications Changed (3):  Norco, Morphine and Trazodone. - Identified Potential Medication Interactions: The following clinically significant interactions were identified via [employer] Interaction Analysis as category D or higher: Norco, Diazepam, Morphine - .category D interaction. Increased sedation and CNS depression. Patient appears to have been taking these medications for a long time. Continue to monitor. Unsure if patient needs all of these medications concurrently. - Renal Dosing: No renal adjustments necessary.     - Follow up appointment date (7 days for more severe illness, 14 days for others):    · Patient encouraged to schedule follow up with PCP.      Thank you,    Monica Hodges, PharmD  [address]  Phone: [phone]      CLINICAL PHARMACY NOTE   POST-DISCHARGE TELEPHONE FOLLOW-UP ADDENDUM    For Pharmacy Admin Tracking Only    TCM Call Made?: Yes  [facility] Select Patient?: Yes  Total # of Interventions Recommended: 4 -   - Updated Order #: 3  Total # Interventions Accepted: 3  Intervention Severity:   - Level 1 Intervention Present?: No   - Level 2 #: 0   - Level 3 #: 1  Outreach Status: Review Complete  Care Coordinator Outreach to Patient?: No  Provider Contacted?: No  Time Spent (min): 30    Additional Documentation:

## 2017-10-19 ENCOUNTER — TELEPHONE (OUTPATIENT)
Dept: INTERNAL MEDICINE | Age: 67
End: 2017-10-19

## 2017-10-20 ENCOUNTER — HOSPITAL ENCOUNTER (OUTPATIENT)
Dept: OTHER | Age: 67
Discharge: HOME | End: 2017-10-20
Attending: FAMILY MEDICINE | Admitting: FAMILY MEDICINE

## 2017-10-25 ENCOUNTER — CARE COORDINATION (OUTPATIENT)
Dept: CASE MANAGEMENT | Age: 67
End: 2017-10-25

## 2017-10-25 ENCOUNTER — TELEPHONE (OUTPATIENT)
Dept: INTERNAL MEDICINE | Age: 67
End: 2017-10-25

## 2017-10-25 NOTE — TELEPHONE ENCOUNTER
Pt's daughter, Alba Hernandez, who is on HIPAA, stopped by to  [facility] paperwork. States her dad is supposed to be in rehab due to falling and general mobility and weakness  He decided to leave rehab yesterday afternoon. He was not discharged. He is at home.   Radha Lowe has concerns that he has some neurologic changes: he is not thinking clearly, very emotional, having panic attacks  Radha Lowe wanted him to have an MRI while at [facility] did not have it because he is claustrophic and hospital would not \"knock him out\"  Dr Pawan Peralta was the attending at the acute rehab unit   Please leave a message for Radha Lowe with what to do for her dad since she will be at work and will not be able to answer

## 2017-10-25 NOTE — TELEPHONE ENCOUNTER
Spoke with patient's Daughter Colton Zimmer, who is OT at [facility]. While Mr. Daryle Rutherford was at [facility] in [place] [address], there were concerns of a stroke, neurology was consulted, Dr. Mik Estrada saw him and ordered for an MRI. Mr. Daryle Rutherford was not able to perform the MRI due to claustrophobia, was given ativan and haldol in the hospital to help sedate him, but it did not work, he was not able to get MRI done, was hitting pulling at lines and equipment. He ended up in restraints at one point. On 10/24/17 Mr. Miller called his son to come pick him up and said he was leaving AMA- rehab doc did not [facility]. He is now at home with his son who is not stable to care for him due to his own mental illness. Colton Zimmer is wanting to get an MRI ordered for the patient outpatient to see if he did have a stroke as this behavior is not normal. He is former [facility] patient and at one point was given xanax to help him get MRI. She has not spoken to Dr. Mik Estraad yet out patient, I think this is who she needs to speak with as he was the neurologist seeing the patient in the hospital, are you okay with this?

## 2017-10-25 NOTE — TELEPHONE ENCOUNTER
Pt called to say he left [facility] yesterday without officially being discharged  He got a rash under his arm pits and in his groin while in rehab and Rehab gave him a cream but it didn't \"do any good\" so he left. Cardiologist gave pt new medications and he wants to go over them: he was given a blood thinner, a shot in his stomach for blood clots and he doesn't think anything new  Since pt left he has not had any of the new medications that were given to him and he is wondering if something will happen since he is not taking these meds.   Pt will also need a HFU

## 2017-10-25 NOTE — CARE COORDINATION
[facility] Transitions Initial Follow Up Call    Call within 2 business days of discharge: Yes    Patient: Raghavendra Martinez Patient : 1950   MRN: [id number]  Reason for Admission: A. Fib w/RVR  Discharge Date: 10/24/17 RARS: [facility]     Facility: [facility]    Patient left AMA yesterday. Attempted to reach patient for 24hr post hospital transition call, no answer.  states memory full so unable to leave message. Message sent to PCP to inform that patient left AMA.      Himanshu Moran RN  Care Transitions Coordinator  [phone]          Follow Up  Future Appointments  Date Time Provider Pierre Vázquez   2017 11:00 AM [facility]       Himanshu Moran RN

## 2017-10-27 NOTE — TELEPHONE ENCOUNTER
So, after review w/LEATHA Orielly and my supervisor, Berhane Bailon Coord Mgr- pt will be contacted again to see if he is now willing to schedule hospital f/u. Home care remains an option IF pt is willing. I will monitor outcome of current status, and make outreach when/as appropriate to offer ongoing Care Coordination - as it sounds like it would be indicated, if he is willing to engage for goal setting. Hopefully, pt will agree to tx recommendations and follow up to address current needs in order to later focus on self mgmt concepts.

## 2017-10-31 ENCOUNTER — CARE COORDINATION (OUTPATIENT)
Dept: CARE COORDINATION | Age: 67
End: 2017-10-31

## 2017-10-31 ENCOUNTER — OFFICE VISIT (OUTPATIENT)
Dept: INTERNAL MEDICINE | Age: 67
End: 2017-10-31

## 2017-10-31 VITALS
WEIGHT: 279 LBS | HEART RATE: 66 BPM | BODY MASS INDEX: 37.79 KG/M2 | SYSTOLIC BLOOD PRESSURE: 168 MMHG | OXYGEN SATURATION: 95 % | TEMPERATURE: 98.3 F | HEIGHT: 72 IN | DIASTOLIC BLOOD PRESSURE: 70 MMHG

## 2017-10-31 DIAGNOSIS — G89.29 OTHER CHRONIC BACK PAIN: Primary | ICD-10-CM

## 2017-10-31 DIAGNOSIS — I48.0 PAROXYSMAL A-FIB (HCC): ICD-10-CM

## 2017-10-31 DIAGNOSIS — M54.9 OTHER CHRONIC BACK PAIN: Primary | ICD-10-CM

## 2017-10-31 DIAGNOSIS — E11.51 TYPE 2 DIABETES MELLITUS WITH DIABETIC PERIPHERAL ANGIOPATHY WITHOUT GANGRENE, WITHOUT LONG-TERM CURRENT USE OF INSULIN (HCC): ICD-10-CM

## 2017-10-31 PROCEDURE — G8484 FLU IMMUNIZE NO ADMIN: HCPCS | Performed by: NURSE PRACTITIONER

## 2017-10-31 PROCEDURE — G8598 ASA/ANTIPLAT THER USED: HCPCS | Performed by: NURSE PRACTITIONER

## 2017-10-31 PROCEDURE — 3017F COLORECTAL CA SCREEN DOC REV: CPT | Performed by: NURSE PRACTITIONER

## 2017-10-31 PROCEDURE — G8427 DOCREV CUR MEDS BY ELIG CLIN: HCPCS | Performed by: NURSE PRACTITIONER

## 2017-10-31 PROCEDURE — G8417 CALC BMI ABV UP PARAM F/U: HCPCS | Performed by: NURSE PRACTITIONER

## 2017-10-31 PROCEDURE — 99214 OFFICE O/P EST MOD 30 MIN: CPT | Performed by: NURSE PRACTITIONER

## 2017-10-31 PROCEDURE — 1111F DSCHRG MED/CURRENT MED MERGE: CPT | Performed by: NURSE PRACTITIONER

## 2017-10-31 PROCEDURE — 4040F PNEUMOC VAC/ADMIN/RCVD: CPT | Performed by: NURSE PRACTITIONER

## 2017-10-31 PROCEDURE — 1123F ACP DISCUSS/DSCN MKR DOCD: CPT | Performed by: NURSE PRACTITIONER

## 2017-10-31 PROCEDURE — 1036F TOBACCO NON-USER: CPT | Performed by: NURSE PRACTITIONER

## 2017-10-31 PROCEDURE — 3044F HG A1C LEVEL LT 7.0%: CPT | Performed by: NURSE PRACTITIONER

## 2017-11-01 ENCOUNTER — TELEPHONE (OUTPATIENT)
Dept: INTERNAL MEDICINE | Age: 67
End: 2017-11-01

## 2017-11-01 ASSESSMENT — ENCOUNTER SYMPTOMS
RHINORRHEA: 0
SORE THROAT: 0
COUGH: 0
WHEEZING: 0
EYE ITCHING: 0
CHEST TIGHTNESS: 0
NAUSEA: 0
VOMITING: 0
SHORTNESS OF BREATH: 0
ABDOMINAL PAIN: 0
CONSTIPATION: 0
SINUS PRESSURE: 0
BLOOD IN STOOL: 0
DIARRHEA: 0
BACK PAIN: 1
COLOR CHANGE: 0
EYE REDNESS: 0

## 2017-11-01 NOTE — PROGRESS NOTES
Sexual activity: Yes     Partners: Female     Other Topics Concern    Not on file     Social History Narrative    No narrative on file       Review of Systems   Constitutional: Negative for chills, fatigue and fever. HENT: Negative for congestion, ear pain, postnasal drip, rhinorrhea, sinus pressure, sneezing and sore throat. Eyes: Negative for redness and itching. Respiratory: Negative for cough, chest tightness, shortness of breath and wheezing. Cardiovascular: Negative for chest pain and palpitations. Gastrointestinal: Negative for abdominal pain, blood in stool, constipation, diarrhea, nausea and vomiting. Endocrine: Negative for cold intolerance and heat intolerance. Genitourinary: Negative for difficulty urinating, dysuria, flank pain, frequency, hematuria and urgency. Musculoskeletal: Positive for back pain. Negative for arthralgias, joint swelling and myalgias. Skin: Negative for color change, pallor, rash and wound. Allergic/Immunologic: Negative for environmental allergies and food allergies. Neurological: Positive for weakness. Negative for dizziness, seizures, syncope, light-headedness, numbness and headaches. Hematological: Negative for adenopathy. Does not bruise/bleed easily. Psychiatric/Behavioral: Negative for confusion, sleep disturbance and suicidal ideas. The patient is not nervous/anxious and is not hyperactive. Objective:     Vitals:    10/31/17 1513   BP: (!) 168/70   Site: Left Arm   Position: Sitting   Cuff Size: Medium Adult   Pulse: 66   Temp: 98.3 °F (36.8 °C)   SpO2: 95%   Weight: 279 lb (126.6 kg)   Height: 6' (1.829 m)     Physical Exam   Constitutional: He is oriented to person, place, and time. He appears well-developed and well-nourished. HENT:   Head: Normocephalic. Right Ear: External ear normal.   Left Ear: External ear normal.   Eyes: Conjunctivae and EOM are normal. Pupils are equal, round, and reactive to light.    Neck: Normal range of motion. Neck supple. No JVD present. Cardiovascular: Normal rate and regular rhythm. Exam reveals no gallop and no friction rub. No murmur heard. Pulmonary/Chest: Effort normal and breath sounds normal. No respiratory distress. He has no wheezes. Abdominal: Soft. Bowel sounds are normal. He exhibits no distension and no mass. There is no tenderness. There is no rebound and no guarding. Musculoskeletal: Normal range of motion. Lumbar back: He exhibits tenderness and pain. He exhibits no spasm. Rolling walker for ambulation     Neurological: He is alert and oriented to person, place, and time. He has normal reflexes. Skin: Skin is warm and dry. Psychiatric: He has a normal mood and affect. His behavior is normal.     Current Outpatient Prescriptions   Medication Sig Dispense Refill    HYDROcodone-acetaminophen (NORCO)  MG per tablet Take 1 tablet by mouth every 4 hours as needed for Pain . 20 tablet 0    morphine (MSIR) 15 MG tablet Take 1 tablet by mouth every 12 hours as needed for Pain .  20 tablet 0    diazepam (VALIUM) 10 MG tablet TAKE ONE TABLET BY MOUTH EVERY 6 HOURS AS NEEDED 20 tablet 0    warfarin (COUMADIN) 5 MG tablet Take 1 tablet by mouth daily 30 tablet 0    traZODone (DESYREL) 50 MG tablet Take 1 tablet by mouth nightly 30 tablet 0    metoprolol tartrate (LOPRESSOR) 100 MG tablet Take 1 tablet by mouth 2 times daily 60 tablet 3    diltiazem (CARDIZEM CD) 360 MG extended release capsule Take 1 capsule by mouth daily 30 capsule 3    baclofen (LIORESAL) 10 MG tablet Take 10 mg by mouth 3 times daily      potassium chloride (KLOR-CON M) 20 MEQ TBCR extended release tablet Take 2 tablets by mouth 2 times daily 80 tablet 0    atorvastatin (LIPITOR) 40 MG tablet Take 1 tablet by mouth daily 30 tablet 5    senna (SENOKOT) 8.6 MG TABS tablet Take 1 tablet by mouth daily 30 tablet 0    Lift Chair MISC by Does not apply route 1 each 0    amoxicillin (AMOXIL) 500 MG capsule TAKE ONE CAPSULE BY MOUTH TWICE DAILY 60 capsule 5    metFORMIN (GLUCOPHAGE) 1000 MG tablet Take 1 tablet by mouth 2 times daily (with meals) 60 tablet 12    lisinopril-hydrochlorothiazide (PRINZIDE;ZESTORETIC) 20-12.5 MG per tablet Take 2 tablets by mouth daily        No current facility-administered medications for this visit. Assessment:     1. Other chronic back pain    2. Type 2 diabetes mellitus with diabetic peripheral angiopathy without gangrene, without long-term current use of insulin (HCC)    3. Paroxysmal a-fib (HCC)      Plan:   1. Other chronic back pain  - follow up as planned with av, will continue to follow  - he is not interested in home care  - discussed what was recommended for keeping him safe at home with PT/OT/skilled nurse, he refuses  - spoke with Homer Sage, care coordinator, who is following. 2. Type 2 diabetes mellitus with diabetic peripheral angiopathy without gangrene, without long-term current use of insulin (HCC)  - stable, controlled  - a1c is great    3. Paroxysmal a-fib (HCC)  - sinus today  - he is not on anticoagulation  - discussed with patient at this time due to frequent falls, risk outweighs benefits of anticoagulation. Would recommend we continue to monitor, and if strength and weakness improves, can reconsider anticoagulation at that time. Patient is in agreement. Patient does have daughter [facility], but he today states we are not to talk to her. He does not wish to update anything as he says this is just a tiff and things will get better. Discussed use, benefit, and side effects of all prescribed medications. Barriers to medication compliance addressed. All patient questions answered. Pt voiced understanding. All patient questions answered. Pt voiced understanding.

## 2017-11-01 NOTE — TELEPHONE ENCOUNTER
Pt was going to fill out a new Communication Release of Information form because he wanted to take his daughter, Giancarlo Enrique off. Pt had appt on 10.31.17 with Gwyn Camejo and decided that he didn't need to fill out a new Communication form because he was going to be the [employer] and leave her on.

## 2017-11-06 RX ORDER — SENNOSIDES 8.6 MG
TABLET ORAL
Qty: 30 TABLET | Refills: 0 | Status: SHIPPED | OUTPATIENT
Start: 2017-11-06 | End: 2018-04-13

## 2017-11-17 ENCOUNTER — CARE COORDINATION (OUTPATIENT)
Dept: CARE COORDINATION | Age: 67
End: 2017-11-17

## 2017-11-17 NOTE — CARE COORDINATION
Ambulatory Care Coordination Note  11/17/2017  CM Risk Score: 10  Saman Mortality Risk Score: 10.88    ACC: Kuldeep Webb, RN    Summary Note: pt plans to contact spine specialist re ordered MRI which he states is still needed. Verbalizes desire to comply with ordered MRI, but anxious in prospect in attempting this diagnostic again. Declines RNCC support w/this concern. He reviews intent to follow up w/spine specialist 1:1 and will inform PCP care team of outcome. Declines need/want for follow up until after Thanksgiving weekend. He states \"sometime in December is fine\"         Care Coordination Interventions    Program Enrollment:  Rising Risk  Referral from Primary Care Provider:  No  Suggested Interventions and Community Resources  Diabetes Education:  Completed  Fall Risk Prevention:  Completed (Comment: last fall early/mid Oct 2017 at home; legs gave out, he r/t chronic back problems. Verbalized understanding in value of purchase/use of personal safety alert device, considering)  Specialty Services Referral:  Completed (Comment: following w/pain mgmt)  Other Services:  Completed (Comment: states he is member of [employer]; reports organization of great support to him)  Zone Management Tools:  Completed  Other Services or Interventions:  review of Care Coordination and availability of [facility] for added support w/resources and/or pt education in self mgmt concepts         Goals Addressed             Most Recent     Reduce Falls  (pt-stated)   On track (11/17/2017)             I will reduce my risk of falls by the following: Use walking aids like cane or walker  follow recommendations of providers involved in my care    Barriers: none  Plan for overcoming my barriers: N/A  Confidence: 8/10  Anticipated Goal Completion Date: 1/31/18            Prior to Admission medications    Medication Sig Start Date End Date Taking?  Authorizing Provider   senna (SENOKOT) 8.6 MG TABS tablet TAKE ONE TABLET BY MOUTH ONCE DAILY

## 2017-11-20 ENCOUNTER — TELEPHONE (OUTPATIENT)
Dept: INTERNAL MEDICINE | Age: 67
End: 2017-11-20

## 2017-11-20 NOTE — TELEPHONE ENCOUNTER
If he was seen in the ER and they gave Keflex then take that  Do not know anything about the Amoxicillin

## 2017-11-21 RX ORDER — AMOXICILLIN 500 MG/1
CAPSULE ORAL
Qty: 60 CAPSULE | Refills: 5 | Status: SHIPPED | OUTPATIENT
Start: 2017-11-21 | End: 2018-06-05 | Stop reason: SDUPTHER

## 2017-11-27 ENCOUNTER — TELEPHONE (OUTPATIENT)
Dept: INTERNAL MEDICINE | Age: 67
End: 2017-11-27

## 2017-11-27 RX ORDER — CLOTRIMAZOLE AND BETAMETHASONE DIPROPIONATE 10; .64 MG/G; MG/G
CREAM TOPICAL
Qty: 1 TUBE | Refills: 1 | Status: SHIPPED | OUTPATIENT
Start: 2017-11-27 | End: 2018-04-13

## 2017-11-27 NOTE — TELEPHONE ENCOUNTER
Also just looked into Care Everywhere for Consuella Needy Visit and did not find ED note.    ED notes on 11.24.17 [facility] ED and from 11.26.17 when he went back to [facility] ED

## 2017-11-30 ENCOUNTER — TELEPHONE (OUTPATIENT)
Dept: INTERNAL MEDICINE | Age: 67
End: 2017-11-30

## 2017-12-01 ENCOUNTER — CARE COORDINATION (OUTPATIENT)
Dept: CARE COORDINATION | Age: 67
End: 2017-12-01

## 2017-12-01 NOTE — CARE COORDINATION
Unable to reach pt after multiple attempts. Emergency contact is son, same number. Phone remains busy. In absence of ability to reach pt, & since it is late Friday - call placed to [facility] to ascertain if nurse was able to complete home visit today, as noted was the plan according to attending physician per review of ED note. Spoke w/[place], Clinical Mgr @ [facility] who reports pt was not seen today by [id number] Angelo Joshid will not resume care of this individual given severity of comments making aware potential threat to staff (ie pt mentioned discharging gun, reportedly w/added comments of having plenty of ammunition in home to carry out such action). Cathryn recommended pt may be able to receive skilled home health through [facility], stating [facility] does not provide psych nursing but that they often refer to [facility] for such pt needs. Reviewed case w/Care Coordination MSW who expressed grave concern that any further communication w/pt could pose potential physical risk to professionals involved & advises case should be reviewed by supervisor before any further outreach is attempted. Further shares referral to another home health agency is not appropriate, regardless of presence of staffing of psych nurses - given gravity of recent events involving this individual.  Outcome: per Care Coordination Mgr, pt is not to be engaged any further by Care Coordinator at this time.

## 2017-12-06 ENCOUNTER — OFFICE VISIT (OUTPATIENT)
Dept: INTERNAL MEDICINE | Age: 67
End: 2017-12-06

## 2017-12-06 VITALS
WEIGHT: 240 LBS | BODY MASS INDEX: 31.66 KG/M2 | SYSTOLIC BLOOD PRESSURE: 126 MMHG | OXYGEN SATURATION: 95 % | HEART RATE: 94 BPM | DIASTOLIC BLOOD PRESSURE: 76 MMHG

## 2017-12-06 DIAGNOSIS — I10 HTN (HYPERTENSION), BENIGN: ICD-10-CM

## 2017-12-06 DIAGNOSIS — E11.51 TYPE 2 DIABETES MELLITUS WITH DIABETIC PERIPHERAL ANGIOPATHY WITHOUT GANGRENE, WITHOUT LONG-TERM CURRENT USE OF INSULIN (HCC): ICD-10-CM

## 2017-12-06 DIAGNOSIS — I48.91 ATRIAL FIBRILLATION WITH RVR (HCC): ICD-10-CM

## 2017-12-06 DIAGNOSIS — I48.0 PAROXYSMAL A-FIB (HCC): ICD-10-CM

## 2017-12-06 DIAGNOSIS — E11.9 TYPE 2 DIABETES MELLITUS WITHOUT COMPLICATION, WITHOUT LONG-TERM CURRENT USE OF INSULIN (HCC): ICD-10-CM

## 2017-12-06 DIAGNOSIS — I63.20 CEREBROVASCULAR ACCIDENT (CVA) DUE TO OCCLUSION OF PRECEREBRAL ARTERY (HCC): Primary | ICD-10-CM

## 2017-12-06 DIAGNOSIS — I63.20 CEREBROVASCULAR ACCIDENT (CVA) DUE TO OCCLUSION OF PRECEREBRAL ARTERY (HCC): ICD-10-CM

## 2017-12-06 PROBLEM — R55 NEAR SYNCOPE: Status: RESOLVED | Noted: 2017-08-31 | Resolved: 2017-12-06

## 2017-12-06 PROBLEM — R41.0 DELIRIUM: Status: RESOLVED | Noted: 2017-10-11 | Resolved: 2017-12-06

## 2017-12-06 PROBLEM — R29.898 RIGHT LEG WEAKNESS: Status: RESOLVED | Noted: 2017-06-06 | Resolved: 2017-12-06

## 2017-12-06 LAB
A/G RATIO: 1.8 (ref 1.1–2.2)
ALBUMIN SERPL-MCNC: 4.4 G/DL (ref 3.4–5)
ALP BLD-CCNC: 90 U/L (ref 40–129)
ALT SERPL-CCNC: 12 U/L (ref 10–40)
ANION GAP SERPL CALCULATED.3IONS-SCNC: 16 MMOL/L (ref 3–16)
AST SERPL-CCNC: 13 U/L (ref 15–37)
BASOPHILS ABSOLUTE: 0.1 K/UL (ref 0–0.2)
BASOPHILS RELATIVE PERCENT: 0.5 %
BILIRUB SERPL-MCNC: 0.3 MG/DL (ref 0–1)
BILIRUBIN URINE: ABNORMAL
BLOOD, URINE: NEGATIVE
BUN BLDV-MCNC: 27 MG/DL (ref 7–20)
CALCIUM SERPL-MCNC: 9.6 MG/DL (ref 8.3–10.6)
CHLORIDE BLD-SCNC: 101 MMOL/L (ref 99–110)
CLARITY: CLEAR
CO2: 28 MMOL/L (ref 21–32)
COLOR: YELLOW
CREAT SERPL-MCNC: 1.5 MG/DL (ref 0.8–1.3)
EOSINOPHILS ABSOLUTE: 0.2 K/UL (ref 0–0.6)
EOSINOPHILS RELATIVE PERCENT: 2.5 %
EPITHELIAL CELLS, UA: 1 /HPF (ref 0–5)
GFR AFRICAN AMERICAN: 56
GFR NON-AFRICAN AMERICAN: 46
GLOBULIN: 2.5 G/DL
GLUCOSE BLD-MCNC: 187 MG/DL (ref 70–99)
GLUCOSE URINE: 100 MG/DL
HCT VFR BLD CALC: 39.9 % (ref 40.5–52.5)
HEMOGLOBIN: 13.1 G/DL (ref 13.5–17.5)
HYALINE CASTS: 9 /LPF (ref 0–8)
KETONES, URINE: NEGATIVE MG/DL
LEUKOCYTE ESTERASE, URINE: NEGATIVE
LYMPHOCYTES ABSOLUTE: 1.3 K/UL (ref 1–5.1)
LYMPHOCYTES RELATIVE PERCENT: 13 %
MCH RBC QN AUTO: 30.2 PG (ref 26–34)
MCHC RBC AUTO-ENTMCNC: 33 G/DL (ref 31–36)
MCV RBC AUTO: 91.6 FL (ref 80–100)
MICROSCOPIC EXAMINATION: YES
MONOCYTES ABSOLUTE: 0.5 K/UL (ref 0–1.3)
MONOCYTES RELATIVE PERCENT: 4.8 %
NEUTROPHILS ABSOLUTE: 7.9 K/UL (ref 1.7–7.7)
NEUTROPHILS RELATIVE PERCENT: 79.2 %
NITRITE, URINE: NEGATIVE
PDW BLD-RTO: 14.8 % (ref 12.4–15.4)
PH UA: 5.5
PLATELET # BLD: 251 K/UL (ref 135–450)
PMV BLD AUTO: 9.2 FL (ref 5–10.5)
POTASSIUM SERPL-SCNC: 4.7 MMOL/L (ref 3.5–5.1)
PROTEIN UA: ABNORMAL MG/DL
RBC # BLD: 4.35 M/UL (ref 4.2–5.9)
RBC UA: 3 /HPF (ref 0–4)
SODIUM BLD-SCNC: 145 MMOL/L (ref 136–145)
SPECIFIC GRAVITY UA: 1.03
TOTAL PROTEIN: 6.9 G/DL (ref 6.4–8.2)
TSH SERPL DL<=0.05 MIU/L-ACNC: 1.2 UIU/ML (ref 0.27–4.2)
UROBILINOGEN, URINE: 0.2 E.U./DL
WBC # BLD: 10 K/UL (ref 4–11)
WBC UA: 2 /HPF (ref 0–5)

## 2017-12-06 PROCEDURE — 3044F HG A1C LEVEL LT 7.0%: CPT | Performed by: INTERNAL MEDICINE

## 2017-12-06 PROCEDURE — G8427 DOCREV CUR MEDS BY ELIG CLIN: HCPCS | Performed by: INTERNAL MEDICINE

## 2017-12-06 PROCEDURE — G8417 CALC BMI ABV UP PARAM F/U: HCPCS | Performed by: INTERNAL MEDICINE

## 2017-12-06 PROCEDURE — 3017F COLORECTAL CA SCREEN DOC REV: CPT | Performed by: INTERNAL MEDICINE

## 2017-12-06 PROCEDURE — 1123F ACP DISCUSS/DSCN MKR DOCD: CPT | Performed by: INTERNAL MEDICINE

## 2017-12-06 PROCEDURE — G8484 FLU IMMUNIZE NO ADMIN: HCPCS | Performed by: INTERNAL MEDICINE

## 2017-12-06 PROCEDURE — 99214 OFFICE O/P EST MOD 30 MIN: CPT | Performed by: INTERNAL MEDICINE

## 2017-12-06 PROCEDURE — G8598 ASA/ANTIPLAT THER USED: HCPCS | Performed by: INTERNAL MEDICINE

## 2017-12-06 PROCEDURE — 4040F PNEUMOC VAC/ADMIN/RCVD: CPT | Performed by: INTERNAL MEDICINE

## 2017-12-06 PROCEDURE — 1036F TOBACCO NON-USER: CPT | Performed by: INTERNAL MEDICINE

## 2017-12-06 RX ORDER — AMIODARONE HYDROCHLORIDE 200 MG/1
400 TABLET ORAL DAILY
Qty: 30 TABLET | Refills: 3
Start: 2017-12-06 | End: 2018-03-13 | Stop reason: SDUPTHER

## 2017-12-06 ASSESSMENT — ENCOUNTER SYMPTOMS
WHEEZING: 0
CHEST TIGHTNESS: 0
RESPIRATORY NEGATIVE: 1
GASTROINTESTINAL NEGATIVE: 1
SHORTNESS OF BREATH: 0

## 2017-12-06 NOTE — ASSESSMENT & PLAN NOTE
Episode of recurrence  Started on Amiodarone  Complains of fatigue  Following with cardiology  Not on anticoagulation

## 2017-12-06 NOTE — PROGRESS NOTES
Subjective:      Patient ID: Jessica Quijano is a 75 y.o. y.o. male. HPI    Patient is here for a recheck    Vitals:    12/06/17 1550   BP: 126/76   Pulse: 94   SpO2: 95%       Wt Readings from Last 3 Encounters:   12/06/17 240 lb (108.9 kg)   11/30/17 242 lb (109.8 kg)   10/31/17 279 lb (126.6 kg)     Patient states he was trying to sort his pills. He called visiting nurses for help. They did not help and he said he should just blow his head off and they sent him to the ER for suicidal thoughts. He was deemed not suicidal and sent home. He had recurrence of atrial fibrillation last week. He was brought back to the ER. He was sent home on Cardizem and Lopressor  He was started on Amiodarone. Discussed use, benefit, and side effects of prescribed medications. Barriers to medication compliance addressed. All patient questions answered. Pt voiced understanding. Review of Systems   Constitutional: Positive for fatigue. HENT: Negative. Respiratory: Negative. Negative for chest tightness, shortness of breath and wheezing. Cardiovascular: Positive for palpitations. Negative for chest pain and leg swelling. Gastrointestinal: Negative. Genitourinary: Negative. Musculoskeletal: Negative for arthralgias and myalgias. Neurological: Negative. Objective:   Physical Exam   Constitutional: He appears well-developed and well-nourished. Neck: Trachea normal. No JVD present. Carotid bruit is not present. No edema present. Cardiovascular: Normal rate, regular rhythm, S1 normal and S2 normal.    No murmur heard. Pulmonary/Chest: Effort normal and breath sounds normal. He has no wheezes. He has no rhonchi. He has no rales. Musculoskeletal: He exhibits no edema.        Assessment:      See Problem List assessment and plan       Plan:     Paroxysmal a-fib ([address]  Episode of recurrence  Started on Amiodarone  Complains of fatigue  Following with cardiology  Not on anticoagulation    DM

## 2017-12-07 ENCOUNTER — TELEPHONE (OUTPATIENT)
Dept: INTERNAL MEDICINE | Age: 67
End: 2017-12-07

## 2017-12-07 NOTE — TELEPHONE ENCOUNTER
Violeta Amin from A returning call to [facility] to say they did receive the order and they need the H&P and demographics  Fax: [phone]

## 2017-12-08 ENCOUNTER — TELEPHONE (OUTPATIENT)
Dept: INTERNAL MEDICINE | Age: 67
End: 2017-12-08

## 2017-12-09 LAB — VITAMIN D 1,25-DIHYDROXY: 25.1 PG/ML (ref 19.9–79.3)

## 2017-12-11 ENCOUNTER — TELEPHONE (OUTPATIENT)
Dept: INTERNAL MEDICINE | Age: 67
End: 2017-12-11

## 2017-12-11 DIAGNOSIS — I10 ESSENTIAL HYPERTENSION: Primary | ICD-10-CM

## 2017-12-11 NOTE — TELEPHONE ENCOUNTER
They would like to have a Creatine repeated prior to CT scan. Can this be placed in to the patient's chart?

## 2017-12-12 ENCOUNTER — HOSPITAL ENCOUNTER (OUTPATIENT)
Dept: CT IMAGING | Age: 67
Discharge: HOME | End: 2017-12-12
Attending: INTERNAL MEDICINE | Admitting: INTERNAL MEDICINE

## 2017-12-12 DIAGNOSIS — I63.20 CEREBRAL INFARCTION DUE TO OCCLUSION OR STENOSIS OF PRECEREBRAL ARTERY (HCC): ICD-10-CM

## 2017-12-12 DIAGNOSIS — I63.20 CEREBROVASCULAR ACCIDENT (CVA) DUE TO OCCLUSION OF PRECEREBRAL ARTERY (HCC): ICD-10-CM

## 2017-12-12 DIAGNOSIS — E11.51 TYPE 2 DIABETES MELLITUS WITH DIABETIC PERIPHERAL ANGIOPATHY WITHOUT GANGRENE, WITHOUT LONG-TERM CURRENT USE OF INSULIN (HCC): ICD-10-CM

## 2017-12-12 DIAGNOSIS — I48.91 ATRIAL FIBRILLATION WITH RVR (HCC): ICD-10-CM

## 2017-12-12 LAB
ALBUMIN SERPL-MCNC: 4.2 G/DL (ref 3.4–5)
ANION GAP SERPL CALCULATED.3IONS-SCNC: 11 MMOL/L (ref 3–16)
BUN BLDV-MCNC: 19 MG/DL (ref 7–20)
CALCIUM SERPL-MCNC: 9.4 MG/DL (ref 8.3–10.6)
CHLORIDE BLD-SCNC: 97 MMOL/L (ref 99–110)
CO2: 30 MMOL/L (ref 21–32)
CREAT SERPL-MCNC: 1.2 MG/DL (ref 0.8–1.3)
GFR AFRICAN AMERICAN: >60
GFR NON-AFRICAN AMERICAN: >60
GLUCOSE BLD-MCNC: 108 MG/DL (ref 70–99)
PHOSPHORUS: 2.8 MG/DL (ref 2.5–4.9)
POTASSIUM SERPL-SCNC: 4.4 MMOL/L (ref 3.5–5.1)
SODIUM BLD-SCNC: 138 MMOL/L (ref 136–145)

## 2017-12-15 DIAGNOSIS — R89.9 ABNORMAL LABORATORY TEST RESULT: Primary | ICD-10-CM

## 2017-12-21 ENCOUNTER — TELEPHONE (OUTPATIENT)
Dept: INTERNAL MEDICINE | Age: 67
End: 2017-12-21

## 2017-12-21 NOTE — TELEPHONE ENCOUNTER
Pt cannot eat, he is eating one bagel a day-he has not appetite  He staying hydrated   He is weak and cannot \"move around\"  No nausea  No vomiting   No diarrhea  Pt states he has felt this way for a couple of months

## 2017-12-26 ENCOUNTER — TELEPHONE (OUTPATIENT)
Dept: INTERNAL MEDICINE | Age: 67
End: 2017-12-26

## 2017-12-26 NOTE — TELEPHONE ENCOUNTER
Call to the patient to follow up. No answer. LMOM for the patient follow up with us and let us know how things are going.

## 2017-12-26 NOTE — TELEPHONE ENCOUNTER
As in last message, recommend ER  If not able to keep anything down and no bowel movements for two weeks, needs to go to ER

## 2017-12-28 ENCOUNTER — TELEPHONE (OUTPATIENT)
Dept: INTERNAL MEDICINE | Age: 67
End: 2017-12-28

## 2018-01-03 NOTE — TELEPHONE ENCOUNTER
3rd attempt to reach the patient. Spoke to the patient and advised. He would like Home Care to come see him. Referral sent into [facility].

## 2018-01-25 ENCOUNTER — TELEPHONE (OUTPATIENT)
Dept: INTERNAL MEDICINE | Age: 68
End: 2018-01-25

## 2018-01-25 NOTE — TELEPHONE ENCOUNTER
Returning call to [facility]. If patient refuses these items then patient should not receive these items. We do have Medicare listed for the patient, but that could have changed for the patient. LMOM for Chata at [facility].

## 2018-01-25 NOTE — TELEPHONE ENCOUNTER
Chata with [facility] received the order for bed and trapeze  Pt doesn't want these items because he states he doesn't have insurance-Medicare is listed in his registration   The order is also wrong: the order cannot have the justification written on it-the justification has to be in the office notes or addended to it.      If pt does want these items a new order will need to be faxed  Please call Chata to discuss

## 2018-01-29 ENCOUNTER — HOSPITAL ENCOUNTER (OUTPATIENT)
Dept: OTHER | Age: 68
Discharge: HOME | End: 2018-01-29
Attending: INTERNAL MEDICINE | Admitting: INTERNAL MEDICINE

## 2018-01-29 ENCOUNTER — OFFICE VISIT (OUTPATIENT)
Dept: INTERNAL MEDICINE | Age: 68
End: 2018-01-29

## 2018-01-29 VITALS
BODY MASS INDEX: 27.57 KG/M2 | HEART RATE: 74 BPM | HEIGHT: 73 IN | WEIGHT: 208 LBS | OXYGEN SATURATION: 97 % | SYSTOLIC BLOOD PRESSURE: 118 MMHG | DIASTOLIC BLOOD PRESSURE: 76 MMHG

## 2018-01-29 DIAGNOSIS — I10 HTN (HYPERTENSION), BENIGN: ICD-10-CM

## 2018-01-29 DIAGNOSIS — E11.9 TYPE 2 DIABETES MELLITUS WITHOUT COMPLICATION, WITHOUT LONG-TERM CURRENT USE OF INSULIN (HCC): ICD-10-CM

## 2018-01-29 DIAGNOSIS — I63.20 CEREBROVASCULAR ACCIDENT (CVA) DUE TO OCCLUSION OF PRECEREBRAL ARTERY (HCC): ICD-10-CM

## 2018-01-29 DIAGNOSIS — I48.0 PAROXYSMAL A-FIB (HCC): ICD-10-CM

## 2018-01-29 DIAGNOSIS — R63.4 WEIGHT LOSS: ICD-10-CM

## 2018-01-29 DIAGNOSIS — I25.10 CORONARY ARTERY DISEASE INVOLVING NATIVE CORONARY ARTERY OF NATIVE HEART WITHOUT ANGINA PECTORIS: ICD-10-CM

## 2018-01-29 DIAGNOSIS — R63.4 WEIGHT LOSS: Primary | ICD-10-CM

## 2018-01-29 DIAGNOSIS — Z12.5 ENCOUNTER FOR SCREENING FOR MALIGNANT NEOPLASM OF PROSTATE: ICD-10-CM

## 2018-01-29 LAB
BASOPHILS ABSOLUTE: 0.1 K/UL (ref 0–0.2)
BASOPHILS RELATIVE PERCENT: 0.8 %
BILIRUBIN URINE: ABNORMAL
BLOOD, URINE: NEGATIVE
CLARITY: CLEAR
COLOR: YELLOW
EOSINOPHILS ABSOLUTE: 0.3 K/UL (ref 0–0.6)
EOSINOPHILS RELATIVE PERCENT: 2.4 %
EPITHELIAL CELLS, UA: 1 /HPF (ref 0–5)
GLUCOSE URINE: NEGATIVE MG/DL
HCT VFR BLD CALC: 38.9 % (ref 40.5–52.5)
HEMOGLOBIN: 12.6 G/DL (ref 13.5–17.5)
HYALINE CASTS: 9 /LPF (ref 0–8)
KETONES, URINE: ABNORMAL MG/DL
LEUKOCYTE ESTERASE, URINE: NEGATIVE
LYMPHOCYTES ABSOLUTE: 1.8 K/UL (ref 1–5.1)
LYMPHOCYTES RELATIVE PERCENT: 15.7 %
MCH RBC QN AUTO: 29.8 PG (ref 26–34)
MCHC RBC AUTO-ENTMCNC: 32.4 G/DL (ref 31–36)
MCV RBC AUTO: 92.1 FL (ref 80–100)
MICROSCOPIC EXAMINATION: YES
MONOCYTES ABSOLUTE: 0.7 K/UL (ref 0–1.3)
MONOCYTES RELATIVE PERCENT: 5.7 %
NEUTROPHILS ABSOLUTE: 8.7 K/UL (ref 1.7–7.7)
NEUTROPHILS RELATIVE PERCENT: 75.4 %
NITRITE, URINE: NEGATIVE
PDW BLD-RTO: 16.7 % (ref 12.4–15.4)
PH UA: 5
PLATELET # BLD: 256 K/UL (ref 135–450)
PMV BLD AUTO: 9.8 FL (ref 5–10.5)
PROTEIN UA: ABNORMAL MG/DL
RBC # BLD: 4.23 M/UL (ref 4.2–5.9)
RBC UA: 4 /HPF (ref 0–4)
SPECIFIC GRAVITY UA: 1.02
UROBILINOGEN, URINE: 0.2 E.U./DL
WBC # BLD: 11.5 K/UL (ref 4–11)
WBC UA: 2 /HPF (ref 0–5)

## 2018-01-29 PROCEDURE — 1123F ACP DISCUSS/DSCN MKR DOCD: CPT | Performed by: INTERNAL MEDICINE

## 2018-01-29 PROCEDURE — 4040F PNEUMOC VAC/ADMIN/RCVD: CPT | Performed by: INTERNAL MEDICINE

## 2018-01-29 PROCEDURE — G8599 NO ASA/ANTIPLAT THER USE RNG: HCPCS | Performed by: INTERNAL MEDICINE

## 2018-01-29 PROCEDURE — G8417 CALC BMI ABV UP PARAM F/U: HCPCS | Performed by: INTERNAL MEDICINE

## 2018-01-29 PROCEDURE — 3046F HEMOGLOBIN A1C LEVEL >9.0%: CPT | Performed by: INTERNAL MEDICINE

## 2018-01-29 PROCEDURE — 1036F TOBACCO NON-USER: CPT | Performed by: INTERNAL MEDICINE

## 2018-01-29 PROCEDURE — 99214 OFFICE O/P EST MOD 30 MIN: CPT | Performed by: INTERNAL MEDICINE

## 2018-01-29 PROCEDURE — 3017F COLORECTAL CA SCREEN DOC REV: CPT | Performed by: INTERNAL MEDICINE

## 2018-01-29 PROCEDURE — G8484 FLU IMMUNIZE NO ADMIN: HCPCS | Performed by: INTERNAL MEDICINE

## 2018-01-29 PROCEDURE — G8428 CUR MEDS NOT DOCUMENT: HCPCS | Performed by: INTERNAL MEDICINE

## 2018-01-29 ASSESSMENT — ENCOUNTER SYMPTOMS
RESPIRATORY NEGATIVE: 1
SHORTNESS OF BREATH: 0
WHEEZING: 0
CHEST TIGHTNESS: 0
GASTROINTESTINAL NEGATIVE: 1

## 2018-01-30 LAB
A/G RATIO: 2.2 (ref 1.1–2.2)
ALBUMIN SERPL-MCNC: 4.7 G/DL (ref 3.4–5)
ALP BLD-CCNC: 66 U/L (ref 40–129)
ALT SERPL-CCNC: 17 U/L (ref 10–40)
ANION GAP SERPL CALCULATED.3IONS-SCNC: 17 MMOL/L (ref 3–16)
AST SERPL-CCNC: 16 U/L (ref 15–37)
BILIRUB SERPL-MCNC: 0.3 MG/DL (ref 0–1)
BUN BLDV-MCNC: 27 MG/DL (ref 7–20)
CALCIUM SERPL-MCNC: 10.2 MG/DL (ref 8.3–10.6)
CHLORIDE BLD-SCNC: 99 MMOL/L (ref 99–110)
CO2: 27 MMOL/L (ref 21–32)
CREAT SERPL-MCNC: 1.6 MG/DL (ref 0.8–1.3)
CREATININE URINE: 210.3 MG/DL (ref 39–259)
ESTIMATED AVERAGE GLUCOSE: 125.5 MG/DL
GFR AFRICAN AMERICAN: 52
GFR NON-AFRICAN AMERICAN: 43
GLOBULIN: 2.1 G/DL
GLUCOSE BLD-MCNC: 117 MG/DL (ref 70–99)
HBA1C MFR BLD: 6 %
MICROALBUMIN UR-MCNC: 1.9 MG/DL
MICROALBUMIN/CREAT UR-RTO: 9 MG/G (ref 0–30)
POTASSIUM SERPL-SCNC: 5 MMOL/L (ref 3.5–5.1)
PROSTATE SPECIFIC ANTIGEN: 0.36 NG/ML (ref 0–4)
SODIUM BLD-SCNC: 143 MMOL/L (ref 136–145)
TOTAL PROTEIN: 6.8 G/DL (ref 6.4–8.2)
TSH SERPL DL<=0.05 MIU/L-ACNC: 2.02 UIU/ML (ref 0.27–4.2)

## 2018-02-01 DIAGNOSIS — R11.11 VOMITING WITHOUT NAUSEA, INTRACTABILITY OF VOMITING NOT SPECIFIED, UNSPECIFIED VOMITING TYPE: ICD-10-CM

## 2018-02-01 DIAGNOSIS — R11.0 NAUSEA: Primary | ICD-10-CM

## 2018-02-09 ENCOUNTER — HOSPITAL ENCOUNTER (OUTPATIENT)
Dept: CT IMAGING | Age: 68
Discharge: HOME | End: 2018-02-09
Attending: EMERGENCY MEDICINE | Admitting: EMERGENCY MEDICINE

## 2018-02-09 ENCOUNTER — HOSPITAL ENCOUNTER (OUTPATIENT)
Dept: OTHER | Age: 68
Discharge: HOME | End: 2018-02-09
Attending: INTERNAL MEDICINE | Admitting: INTERNAL MEDICINE

## 2018-02-09 DIAGNOSIS — R11.11 VOMITING WITHOUT NAUSEA: ICD-10-CM

## 2018-02-09 DIAGNOSIS — R11.0 NAUSEA: ICD-10-CM

## 2018-02-09 DIAGNOSIS — R11.11 VOMITING WITHOUT NAUSEA, INTRACTABILITY OF VOMITING NOT SPECIFIED, UNSPECIFIED VOMITING TYPE: ICD-10-CM

## 2018-02-13 ENCOUNTER — TELEPHONE (OUTPATIENT)
Dept: INTERNAL MEDICINE | Age: 68
End: 2018-02-13

## 2018-02-15 ENCOUNTER — TELEPHONE (OUTPATIENT)
Dept: INTERNAL MEDICINE | Age: 68
End: 2018-02-15

## 2018-02-20 ENCOUNTER — TELEPHONE (OUTPATIENT)
Dept: INTERNAL MEDICINE | Age: 68
End: 2018-02-20

## 2018-02-20 NOTE — TELEPHONE ENCOUNTER
Calling Cassidy Campuzano. Patient was moved up to 02/26/2018. Is seeing Dr. Cheo Garcia. He is fitting him in on that day.

## 2018-03-13 ENCOUNTER — TELEPHONE (OUTPATIENT)
Dept: INTERNAL MEDICINE | Age: 68
End: 2018-03-13

## 2018-03-13 ENCOUNTER — TELEPHONE (OUTPATIENT)
Dept: RHEUMATOLOGY | Age: 68
End: 2018-03-13

## 2018-03-13 RX ORDER — AMIODARONE HYDROCHLORIDE 200 MG/1
400 TABLET ORAL DAILY
Qty: 30 TABLET | Refills: 3 | OUTPATIENT
Start: 2018-03-13

## 2018-03-13 RX ORDER — METOPROLOL TARTRATE 100 MG/1
100 TABLET ORAL 2 TIMES DAILY
Qty: 60 TABLET | Refills: 3 | Status: SHIPPED | OUTPATIENT
Start: 2018-03-13 | End: 2018-03-30

## 2018-03-13 RX ORDER — AMIODARONE HYDROCHLORIDE 200 MG/1
400 TABLET ORAL DAILY
Qty: 30 TABLET | Refills: 0 | Status: SHIPPED | OUTPATIENT
Start: 2018-03-13 | End: 2018-03-30 | Stop reason: SDUPTHER

## 2018-03-13 RX ORDER — LISINOPRIL AND HYDROCHLOROTHIAZIDE 20; 12.5 MG/1; MG/1
2 TABLET ORAL DAILY
Qty: 30 TABLET | Refills: 0 | Status: SHIPPED | OUTPATIENT
Start: 2018-03-13 | End: 2018-04-04 | Stop reason: SDUPTHER

## 2018-03-13 NOTE — TELEPHONE ENCOUNTER
Patient was prescribed the following medication at the hospital and requesting for it to be refilled  MMetoprolol Tart 25 MG one tab twice daily   Lisinopril  20 MG one tab twice daily   Amiodarone 200 mg two tablets daily

## 2018-03-13 NOTE — TELEPHONE ENCOUNTER
Patient calling concerning the medications he was instructed to refill with a cardiologist     The next available appt with the specialist is 4/26/18 until then he's requesting Dr. Rhea Lemus     To refill the following if possible     Medication amiodarone  Dosage 200 MG   Frequency Take 2 tablets by mouth daily  Last filled on 12/06/17  Pharmacy [facility] [address] [place][note 2]   Last office visit 2/9/18

## 2018-03-15 RX ORDER — DIAZEPAM 10 MG/1
TABLET ORAL
Qty: 120 TABLET | Refills: 0 | Status: SHIPPED | OUTPATIENT
Start: 2018-03-15 | End: 2018-09-06 | Stop reason: SDUPTHER

## 2018-03-26 RX ORDER — METOPROLOL SUCCINATE 25 MG/1
25 TABLET, EXTENDED RELEASE ORAL DAILY
Qty: 30 TABLET | Refills: 3 | Status: SHIPPED | OUTPATIENT
Start: 2018-03-26 | End: 2018-07-05 | Stop reason: SDUPTHER

## 2018-03-29 ENCOUNTER — TELEPHONE (OUTPATIENT)
Dept: INTERNAL MEDICINE | Age: 68
End: 2018-03-29

## 2018-03-30 RX ORDER — AMIODARONE HYDROCHLORIDE 200 MG/1
400 TABLET ORAL DAILY
Qty: 30 TABLET | Refills: 0 | Status: SHIPPED | OUTPATIENT
Start: 2018-03-30 | End: 2018-04-26 | Stop reason: SDUPTHER

## 2018-04-12 PROBLEM — Z00.00 ROUTINE GENERAL MEDICAL EXAMINATION AT A HEALTH CARE FACILITY: Status: RESOLVED | Noted: 2017-09-19 | Resolved: 2018-04-12

## 2018-04-16 ENCOUNTER — OFFICE VISIT (OUTPATIENT)
Dept: INTERNAL MEDICINE | Age: 68
End: 2018-04-16

## 2018-04-16 ENCOUNTER — TELEPHONE (OUTPATIENT)
Dept: INTERNAL MEDICINE | Age: 68
End: 2018-04-16

## 2018-04-16 VITALS
WEIGHT: 230 LBS | SYSTOLIC BLOOD PRESSURE: 142 MMHG | OXYGEN SATURATION: 96 % | BODY MASS INDEX: 30.48 KG/M2 | TEMPERATURE: 97.7 F | HEIGHT: 73 IN | DIASTOLIC BLOOD PRESSURE: 66 MMHG | HEART RATE: 65 BPM

## 2018-04-16 DIAGNOSIS — L03.115 CELLULITIS OF RIGHT LOWER EXTREMITY: ICD-10-CM

## 2018-04-16 DIAGNOSIS — L03.115 CELLULITIS OF RIGHT LOWER EXTREMITY: Primary | ICD-10-CM

## 2018-04-16 PROCEDURE — 1036F TOBACCO NON-USER: CPT | Performed by: NURSE PRACTITIONER

## 2018-04-16 PROCEDURE — G8427 DOCREV CUR MEDS BY ELIG CLIN: HCPCS | Performed by: NURSE PRACTITIONER

## 2018-04-16 PROCEDURE — G8417 CALC BMI ABV UP PARAM F/U: HCPCS | Performed by: NURSE PRACTITIONER

## 2018-04-16 PROCEDURE — 4040F PNEUMOC VAC/ADMIN/RCVD: CPT | Performed by: NURSE PRACTITIONER

## 2018-04-16 PROCEDURE — 3017F COLORECTAL CA SCREEN DOC REV: CPT | Performed by: NURSE PRACTITIONER

## 2018-04-16 PROCEDURE — G8598 ASA/ANTIPLAT THER USED: HCPCS | Performed by: NURSE PRACTITIONER

## 2018-04-16 PROCEDURE — 99213 OFFICE O/P EST LOW 20 MIN: CPT | Performed by: NURSE PRACTITIONER

## 2018-04-16 PROCEDURE — 1123F ACP DISCUSS/DSCN MKR DOCD: CPT | Performed by: NURSE PRACTITIONER

## 2018-04-16 RX ORDER — DOXYCYCLINE HYCLATE 100 MG
100 TABLET ORAL 2 TIMES DAILY
Qty: 20 TABLET | Refills: 0 | Status: SHIPPED | OUTPATIENT
Start: 2018-04-16 | End: 2018-04-26 | Stop reason: ALTCHOICE

## 2018-04-16 RX ORDER — CEPHALEXIN 500 MG/1
500 CAPSULE ORAL 4 TIMES DAILY
Qty: 10 CAPSULE | Refills: 0 | Status: SHIPPED | OUTPATIENT
Start: 2018-04-16 | End: 2018-04-16 | Stop reason: SDUPTHER

## 2018-04-16 RX ORDER — CEPHALEXIN 500 MG/1
500 CAPSULE ORAL 4 TIMES DAILY
Qty: 40 CAPSULE | Refills: 0 | Status: SHIPPED | OUTPATIENT
Start: 2018-04-16 | End: 2018-04-26 | Stop reason: ALTCHOICE

## 2018-04-16 RX ORDER — DIAPER,BRIEF,INFANT-TODD,DISP
EACH MISCELLANEOUS
Qty: 1 TUBE | Refills: 1 | Status: SHIPPED | OUTPATIENT
Start: 2018-04-16 | End: 2018-07-28 | Stop reason: SDUPTHER

## 2018-04-16 ASSESSMENT — ENCOUNTER SYMPTOMS
WHEEZING: 0
COUGH: 0

## 2018-04-19 ENCOUNTER — OFFICE VISIT (OUTPATIENT)
Dept: INTERNAL MEDICINE | Age: 68
End: 2018-04-19

## 2018-04-19 VITALS
HEIGHT: 73 IN | WEIGHT: 230 LBS | BODY MASS INDEX: 30.48 KG/M2 | OXYGEN SATURATION: 97 % | SYSTOLIC BLOOD PRESSURE: 134 MMHG | HEART RATE: 66 BPM | TEMPERATURE: 97.5 F | DIASTOLIC BLOOD PRESSURE: 60 MMHG

## 2018-04-19 DIAGNOSIS — L03.115 CELLULITIS OF RIGHT LOWER EXTREMITY: Primary | ICD-10-CM

## 2018-04-19 PROCEDURE — G8598 ASA/ANTIPLAT THER USED: HCPCS | Performed by: NURSE PRACTITIONER

## 2018-04-19 PROCEDURE — 1123F ACP DISCUSS/DSCN MKR DOCD: CPT | Performed by: NURSE PRACTITIONER

## 2018-04-19 PROCEDURE — 4040F PNEUMOC VAC/ADMIN/RCVD: CPT | Performed by: NURSE PRACTITIONER

## 2018-04-19 PROCEDURE — G8427 DOCREV CUR MEDS BY ELIG CLIN: HCPCS | Performed by: NURSE PRACTITIONER

## 2018-04-19 PROCEDURE — 1036F TOBACCO NON-USER: CPT | Performed by: NURSE PRACTITIONER

## 2018-04-19 PROCEDURE — G8417 CALC BMI ABV UP PARAM F/U: HCPCS | Performed by: NURSE PRACTITIONER

## 2018-04-19 PROCEDURE — 99213 OFFICE O/P EST LOW 20 MIN: CPT | Performed by: NURSE PRACTITIONER

## 2018-04-19 PROCEDURE — 3017F COLORECTAL CA SCREEN DOC REV: CPT | Performed by: NURSE PRACTITIONER

## 2018-04-19 ASSESSMENT — ENCOUNTER SYMPTOMS
WHEEZING: 0
SHORTNESS OF BREATH: 0
COLOR CHANGE: 1
COUGH: 0

## 2018-04-20 ENCOUNTER — TELEPHONE (OUTPATIENT)
Dept: INTERNAL MEDICINE | Age: 68
End: 2018-04-20

## 2018-04-20 RX ORDER — HYDROXYZINE HYDROCHLORIDE 25 MG/1
25 TABLET, FILM COATED ORAL EVERY 4 HOURS PRN
Qty: 30 TABLET | Refills: 0 | Status: SHIPPED | OUTPATIENT
Start: 2018-04-20 | End: 2018-04-30

## 2018-04-26 ENCOUNTER — OFFICE VISIT (OUTPATIENT)
Dept: CARDIOLOGY CLINIC | Age: 68
End: 2018-04-26

## 2018-04-26 VITALS
WEIGHT: 222 LBS | BODY MASS INDEX: 29.42 KG/M2 | HEIGHT: 73 IN | DIASTOLIC BLOOD PRESSURE: 68 MMHG | HEART RATE: 72 BPM | SYSTOLIC BLOOD PRESSURE: 134 MMHG

## 2018-04-26 DIAGNOSIS — I10 HTN (HYPERTENSION), BENIGN: Primary | ICD-10-CM

## 2018-04-26 DIAGNOSIS — I48.0 PAROXYSMAL A-FIB (HCC): ICD-10-CM

## 2018-04-26 PROCEDURE — G8427 DOCREV CUR MEDS BY ELIG CLIN: HCPCS | Performed by: INTERNAL MEDICINE

## 2018-04-26 PROCEDURE — 99204 OFFICE O/P NEW MOD 45 MIN: CPT | Performed by: INTERNAL MEDICINE

## 2018-04-26 PROCEDURE — 3017F COLORECTAL CA SCREEN DOC REV: CPT | Performed by: INTERNAL MEDICINE

## 2018-04-26 PROCEDURE — G8417 CALC BMI ABV UP PARAM F/U: HCPCS | Performed by: INTERNAL MEDICINE

## 2018-04-26 RX ORDER — AMIODARONE HYDROCHLORIDE 200 MG/1
200 TABLET ORAL 2 TIMES DAILY
Qty: 30 TABLET | Refills: 5 | Status: SHIPPED | OUTPATIENT
Start: 2018-04-26 | End: 2018-10-05 | Stop reason: SDUPTHER

## 2018-05-04 ENCOUNTER — TELEPHONE (OUTPATIENT)
Dept: INTERNAL MEDICINE | Age: 68
End: 2018-05-04

## 2018-05-11 ENCOUNTER — OFFICE VISIT (OUTPATIENT)
Dept: INTERNAL MEDICINE | Age: 68
End: 2018-05-11

## 2018-05-11 VITALS
WEIGHT: 214 LBS | SYSTOLIC BLOOD PRESSURE: 132 MMHG | DIASTOLIC BLOOD PRESSURE: 70 MMHG | BODY MASS INDEX: 28.36 KG/M2 | OXYGEN SATURATION: 93 % | HEIGHT: 73 IN | HEART RATE: 62 BPM

## 2018-05-11 DIAGNOSIS — I48.0 PAROXYSMAL A-FIB (HCC): ICD-10-CM

## 2018-05-11 DIAGNOSIS — I10 HTN (HYPERTENSION), BENIGN: ICD-10-CM

## 2018-05-11 DIAGNOSIS — E11.9 TYPE 2 DIABETES MELLITUS WITHOUT COMPLICATION, WITHOUT LONG-TERM CURRENT USE OF INSULIN (HCC): ICD-10-CM

## 2018-05-11 DIAGNOSIS — G89.29 OTHER CHRONIC BACK PAIN: Primary | ICD-10-CM

## 2018-05-11 DIAGNOSIS — M54.9 OTHER CHRONIC BACK PAIN: Primary | ICD-10-CM

## 2018-05-11 PROCEDURE — G8427 DOCREV CUR MEDS BY ELIG CLIN: HCPCS | Performed by: INTERNAL MEDICINE

## 2018-05-11 PROCEDURE — 99213 OFFICE O/P EST LOW 20 MIN: CPT | Performed by: INTERNAL MEDICINE

## 2018-05-11 PROCEDURE — 1036F TOBACCO NON-USER: CPT | Performed by: INTERNAL MEDICINE

## 2018-05-11 PROCEDURE — 2022F DILAT RTA XM EVC RTNOPTHY: CPT | Performed by: INTERNAL MEDICINE

## 2018-05-11 PROCEDURE — G8417 CALC BMI ABV UP PARAM F/U: HCPCS | Performed by: INTERNAL MEDICINE

## 2018-05-11 PROCEDURE — 3017F COLORECTAL CA SCREEN DOC REV: CPT | Performed by: INTERNAL MEDICINE

## 2018-05-11 PROCEDURE — G8598 ASA/ANTIPLAT THER USED: HCPCS | Performed by: INTERNAL MEDICINE

## 2018-05-11 PROCEDURE — 1123F ACP DISCUSS/DSCN MKR DOCD: CPT | Performed by: INTERNAL MEDICINE

## 2018-05-11 PROCEDURE — 4040F PNEUMOC VAC/ADMIN/RCVD: CPT | Performed by: INTERNAL MEDICINE

## 2018-05-11 PROCEDURE — 3044F HG A1C LEVEL LT 7.0%: CPT | Performed by: INTERNAL MEDICINE

## 2018-05-11 ASSESSMENT — ENCOUNTER SYMPTOMS
SHORTNESS OF BREATH: 0
WHEEZING: 0
GASTROINTESTINAL NEGATIVE: 1
CHEST TIGHTNESS: 0
RESPIRATORY NEGATIVE: 1

## 2018-06-05 ENCOUNTER — TELEPHONE (OUTPATIENT)
Dept: INTERNAL MEDICINE | Age: 68
End: 2018-06-05

## 2018-06-05 RX ORDER — AMOXICILLIN 500 MG/1
CAPSULE ORAL
Qty: 60 CAPSULE | Refills: 5 | Status: SHIPPED | OUTPATIENT
Start: 2018-06-05

## 2018-06-07 ENCOUNTER — OFFICE VISIT (OUTPATIENT)
Dept: ORTHOPEDIC SURGERY | Age: 68
End: 2018-06-07

## 2018-06-07 ENCOUNTER — PRE-EVALUATION (OUTPATIENT)
Dept: ORTHOPEDIC SURGERY | Age: 68
End: 2018-06-07

## 2018-06-07 VITALS — HEIGHT: 73 IN | WEIGHT: 214.07 LBS | BODY MASS INDEX: 28.37 KG/M2

## 2018-06-07 DIAGNOSIS — R29.898 WEAKNESS OF BOTH LOWER EXTREMITIES: ICD-10-CM

## 2018-06-07 DIAGNOSIS — R26.81 GAIT INSTABILITY: Primary | ICD-10-CM

## 2018-06-07 PROCEDURE — G8427 DOCREV CUR MEDS BY ELIG CLIN: HCPCS | Performed by: ORTHOPAEDIC SURGERY

## 2018-06-07 PROCEDURE — G8417 CALC BMI ABV UP PARAM F/U: HCPCS | Performed by: ORTHOPAEDIC SURGERY

## 2018-06-07 PROCEDURE — 3017F COLORECTAL CA SCREEN DOC REV: CPT | Performed by: ORTHOPAEDIC SURGERY

## 2018-06-07 PROCEDURE — APPNB30 APP NON BILLABLE TIME 0-30 MINS: Performed by: PHYSICIAN ASSISTANT

## 2018-06-07 PROCEDURE — 99203 OFFICE O/P NEW LOW 30 MIN: CPT | Performed by: ORTHOPAEDIC SURGERY

## 2018-06-28 ENCOUNTER — OFFICE VISIT (OUTPATIENT)
Dept: ORTHOPEDIC SURGERY | Age: 68
End: 2018-06-28

## 2018-06-28 DIAGNOSIS — M54.16 LUMBAR RADICULOPATHY: Primary | ICD-10-CM

## 2018-06-28 PROCEDURE — 95861 NEEDLE EMG 2 EXTREMITIES: CPT | Performed by: PHYSICAL MEDICINE & REHABILITATION

## 2018-07-05 RX ORDER — METOPROLOL SUCCINATE 25 MG/1
TABLET, EXTENDED RELEASE ORAL
Qty: 30 TABLET | Refills: 3 | Status: SHIPPED | OUTPATIENT
Start: 2018-07-05 | End: 2018-11-05 | Stop reason: SDUPTHER

## 2018-07-12 ENCOUNTER — TELEPHONE (OUTPATIENT)
Dept: INTERNAL MEDICINE | Age: 68
End: 2018-07-12

## 2018-07-12 RX ORDER — TRAZODONE HYDROCHLORIDE 50 MG/1
50 TABLET ORAL NIGHTLY
Qty: 30 TABLET | Refills: 0 | Status: SHIPPED | OUTPATIENT
Start: 2018-07-12 | End: 2018-09-11 | Stop reason: SDUPTHER

## 2018-07-12 NOTE — TELEPHONE ENCOUNTER
Patient requesting a medication refill.   Medication trazodone  Dosage 50 mg  Frequency1/nightly  [facility] on 10/21/17  [facility] [address]     LOV: 5/11/18  FOV: 9/14/18

## 2018-07-24 ENCOUNTER — OFFICE VISIT (OUTPATIENT)
Dept: ORTHOPEDIC SURGERY | Age: 68
End: 2018-07-24

## 2018-07-24 VITALS
HEART RATE: 82 BPM | HEIGHT: 72 IN | BODY MASS INDEX: 28.99 KG/M2 | WEIGHT: 214 LBS | SYSTOLIC BLOOD PRESSURE: 128 MMHG | DIASTOLIC BLOOD PRESSURE: 88 MMHG

## 2018-07-24 DIAGNOSIS — M51.36 LUMBAR DEGENERATIVE DISC DISEASE: Primary | ICD-10-CM

## 2018-07-24 PROCEDURE — 3017F COLORECTAL CA SCREEN DOC REV: CPT | Performed by: ORTHOPAEDIC SURGERY

## 2018-07-24 PROCEDURE — 99213 OFFICE O/P EST LOW 20 MIN: CPT | Performed by: ORTHOPAEDIC SURGERY

## 2018-07-24 PROCEDURE — 1123F ACP DISCUSS/DSCN MKR DOCD: CPT | Performed by: ORTHOPAEDIC SURGERY

## 2018-07-24 PROCEDURE — 1036F TOBACCO NON-USER: CPT | Performed by: ORTHOPAEDIC SURGERY

## 2018-07-24 PROCEDURE — 1101F PT FALLS ASSESS-DOCD LE1/YR: CPT | Performed by: ORTHOPAEDIC SURGERY

## 2018-07-24 PROCEDURE — 4040F PNEUMOC VAC/ADMIN/RCVD: CPT | Performed by: ORTHOPAEDIC SURGERY

## 2018-07-24 PROCEDURE — G8598 ASA/ANTIPLAT THER USED: HCPCS | Performed by: ORTHOPAEDIC SURGERY

## 2018-07-24 PROCEDURE — G8427 DOCREV CUR MEDS BY ELIG CLIN: HCPCS | Performed by: ORTHOPAEDIC SURGERY

## 2018-07-24 PROCEDURE — G8417 CALC BMI ABV UP PARAM F/U: HCPCS | Performed by: ORTHOPAEDIC SURGERY

## 2018-07-24 RX ORDER — ALPRAZOLAM 1 MG/1
TABLET ORAL
Qty: 2 TABLET | Refills: 0 | Status: SHIPPED | OUTPATIENT
Start: 2018-07-24 | End: 2018-08-15

## 2018-07-24 NOTE — PROGRESS NOTES
is in obvious pain and alert and oriented to person, place, and time. He demonstrates appropriate mood and affect. His skin is warm and dry. His gait is somewhat unsteady and he uses a walker. He stands with slight lumbar flexion. His lumbar flexion, extension and lateral bending are moderately reduced with pain. He has mild tenderness over his lumbar spine without obvious muscle spasm. The skin over his lumbar spine is normal without a surgical scar. He has 5/5 strength of EHLs, FHLs, foot evertors, ankle dorsiflexors, plantarflexors, quadriceps, hamstrings, iliopsoas, abductors and adductors about the hips bilaterally. He has mild atrophy to his right quad. He has a negative straight leg raise, bilaterally. Achilles and quadriceps reflexes are 1+. Sensation is intact to light touch L3 to S1 bilaterally. He has no clonus. Hip range of motion painless. Imaging:  I reviewed MRI images of his lumbar spine 5/18/17 and CT lumbar spine from 10/2017. 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 EMG shows mild bilateral chronic L5 radiculopathies    Assessment:  Gait instability  Lower extremity weakness    Plan:  I recommended an MRI scan of his cervical and thoracic spines. I do not believe his gait instability is related to lumbar pathology. We'll refer him to neurology for cervical and thoracic spine MRIs do not explain his difficulty with ambulation.

## 2018-07-28 DIAGNOSIS — L03.115 CELLULITIS OF RIGHT LOWER EXTREMITY: ICD-10-CM

## 2018-07-30 RX ORDER — DIAPER,BRIEF,INFANT-TODD,DISP
EACH MISCELLANEOUS
Qty: 1 TUBE | Refills: 1 | Status: SHIPPED | OUTPATIENT
Start: 2018-07-30

## 2018-08-01 RX ORDER — LISINOPRIL AND HYDROCHLOROTHIAZIDE 20; 12.5 MG/1; MG/1
TABLET ORAL
Qty: 30 TABLET | Refills: 3 | Status: SHIPPED | OUTPATIENT
Start: 2018-08-01

## 2018-08-08 ENCOUNTER — TELEPHONE (OUTPATIENT)
Dept: ORTHOPEDIC SURGERY | Age: 68
End: 2018-08-08

## 2018-08-16 ENCOUNTER — OFFICE VISIT (OUTPATIENT)
Dept: ORTHOPEDIC SURGERY | Age: 68
End: 2018-08-16

## 2018-08-16 VITALS
SYSTOLIC BLOOD PRESSURE: 153 MMHG | WEIGHT: 230 LBS | HEART RATE: 72 BPM | BODY MASS INDEX: 30.48 KG/M2 | DIASTOLIC BLOOD PRESSURE: 87 MMHG | HEIGHT: 73 IN

## 2018-08-16 DIAGNOSIS — R29.898 BILATERAL LEG WEAKNESS: ICD-10-CM

## 2018-08-16 DIAGNOSIS — R26.81 GAIT INSTABILITY: Primary | ICD-10-CM

## 2018-08-16 PROCEDURE — 1036F TOBACCO NON-USER: CPT | Performed by: ORTHOPAEDIC SURGERY

## 2018-08-16 PROCEDURE — 4040F PNEUMOC VAC/ADMIN/RCVD: CPT | Performed by: ORTHOPAEDIC SURGERY

## 2018-08-16 PROCEDURE — 3017F COLORECTAL CA SCREEN DOC REV: CPT | Performed by: ORTHOPAEDIC SURGERY

## 2018-08-16 PROCEDURE — G8427 DOCREV CUR MEDS BY ELIG CLIN: HCPCS | Performed by: ORTHOPAEDIC SURGERY

## 2018-08-16 PROCEDURE — G8598 ASA/ANTIPLAT THER USED: HCPCS | Performed by: ORTHOPAEDIC SURGERY

## 2018-08-16 PROCEDURE — 99213 OFFICE O/P EST LOW 20 MIN: CPT | Performed by: ORTHOPAEDIC SURGERY

## 2018-08-16 PROCEDURE — 1123F ACP DISCUSS/DSCN MKR DOCD: CPT | Performed by: ORTHOPAEDIC SURGERY

## 2018-08-16 PROCEDURE — 1101F PT FALLS ASSESS-DOCD LE1/YR: CPT | Performed by: ORTHOPAEDIC SURGERY

## 2018-08-16 PROCEDURE — G8417 CALC BMI ABV UP PARAM F/U: HCPCS | Performed by: ORTHOPAEDIC SURGERY

## 2018-08-16 NOTE — PROGRESS NOTES
Hip range of motion painless. Imaging:  I reviewed MRI images of his lumbar spine 5/18/17 and CT lumbar spine from 10/2017. 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 I reviewed MRI images of his thoracic spine and the office today. They show degenerative disc disease but no evidence of cord compression to explain his difficulty ambulating. EMG shows mild bilateral chronic L5 radiculopathies    Assessment:  Gait instability  Lower extremity weakness    Plan:  He is going to drop off a disc of cervical MRI images from last year. We'll call him with those results.   I recommended he see a neurologist.

## 2018-08-29 ENCOUNTER — TELEPHONE (OUTPATIENT)
Dept: ORTHOPEDIC SURGERY | Age: 68
End: 2018-08-29

## 2018-08-29 DIAGNOSIS — R26.81 GAIT INSTABILITY: ICD-10-CM

## 2018-08-29 DIAGNOSIS — R29.898 BILATERAL LEG WEAKNESS: Primary | ICD-10-CM

## 2018-09-06 ENCOUNTER — TELEPHONE (OUTPATIENT)
Dept: INTERNAL MEDICINE | Age: 68
End: 2018-09-06

## 2018-09-06 RX ORDER — ATORVASTATIN CALCIUM 40 MG/1
TABLET, FILM COATED ORAL
Qty: 30 TABLET | Refills: 5 | Status: SHIPPED | OUTPATIENT
Start: 2018-09-06

## 2018-09-11 RX ORDER — TRAZODONE HYDROCHLORIDE 50 MG/1
50 TABLET ORAL NIGHTLY
Qty: 30 TABLET | Refills: 0 | Status: SHIPPED | OUTPATIENT
Start: 2018-09-11 | End: 2018-10-10 | Stop reason: SDUPTHER

## 2018-09-12 ENCOUNTER — HOSPITAL ENCOUNTER (OUTPATIENT)
Dept: MRI IMAGING | Age: 68
Discharge: HOME | End: 2018-09-12
Attending: ORTHOPAEDIC SURGERY | Admitting: ORTHOPAEDIC SURGERY
Payer: MEDICARE

## 2018-09-12 ENCOUNTER — ANESTHESIA (OUTPATIENT)
Dept: MRI IMAGING | Age: 68
End: 2018-09-12

## 2018-09-12 ENCOUNTER — ANESTHESIA EVENT (OUTPATIENT)
Dept: MRI IMAGING | Age: 68
End: 2018-09-12

## 2018-09-12 VITALS
TEMPERATURE: 98.7 F | RESPIRATION RATE: 16 BRPM | BODY MASS INDEX: 26.51 KG/M2 | DIASTOLIC BLOOD PRESSURE: 73 MMHG | OXYGEN SATURATION: 97 % | HEART RATE: 59 BPM | WEIGHT: 200 LBS | SYSTOLIC BLOOD PRESSURE: 198 MMHG | HEIGHT: 73 IN

## 2018-09-12 DIAGNOSIS — R29.898 BILATERAL LEG WEAKNESS: ICD-10-CM

## 2018-09-12 DIAGNOSIS — R26.81 GAIT INSTABILITY: ICD-10-CM

## 2018-09-12 LAB
GLUCOSE BLD-MCNC: 115 MG/DL (ref 70–99)
GLUCOSE BLD-MCNC: 123 MG/DL (ref 70–99)
PERFORMED ON: ABNORMAL
PERFORMED ON: ABNORMAL

## 2018-09-12 PROCEDURE — 3700000001 HC ADD 15 MINUTES (ANESTHESIA)

## 2018-09-12 PROCEDURE — 72141 MRI NECK SPINE W/O DYE: CPT

## 2018-09-12 PROCEDURE — 3700000000 HC ANESTHESIA ATTENDED CARE

## 2018-09-12 PROCEDURE — 7100000010 HC PHASE II RECOVERY - FIRST 15 MIN

## 2018-09-12 PROCEDURE — 7100000011 HC PHASE II RECOVERY - ADDTL 15 MIN

## 2018-09-12 ASSESSMENT — PAIN - FUNCTIONAL ASSESSMENT: PAIN_FUNCTIONAL_ASSESSMENT: 0-10

## 2018-09-12 NOTE — ANESTHESIA POSTPROCEDURE EVALUATION
Department of Anesthesiology  Postprocedure Note    Patient: Elodia Cheadle  MRN: [id number]  YOB: 1950  Date of evaluation: 9/12/2018  Time:  5:38 PM     Procedure Summary     Date:  09/12/18 Room / Location:  [facility]    Anesthesia Start:  1325 Anesthesia Stop:  6802    Procedure:  MRI CERVICAL SPINE WO CONTRAST Diagnosis:       Bilateral leg weakness      Gait instability      (Gait Instability; Lower leg weakness)    Scheduled Providers:   Responsible Provider:      Anesthesia Type:  general ASA Status:  3          Anesthesia Type: No value filed. Joaquin Phase I: Joaquin Score: 10    Joaquin Phase II: Joaquin Score: 10    Last vitals: Reviewed and per EMR flowsheets.        Anesthesia Post Evaluation    Comments: Postoperative Anesthesia Note    Name:    Elodia Cheadle  MRN:      [id number]    Patient Vitals in the past 12 hrs:  09/12/18 1445, BP:(!) 198/73, Pulse:59, Resp:16, SpO2:97 %  09/12/18 1425, BP:(!) 193/79, Temp:98.7 °F (37.1 °C), Temp src:Temporal, Pulse:63, Resp:16, SpO2:98 %  09/12/18 1315, BP:(!) 192/77  09/12/18 1313, BP:(!) 207/77, Temp:97.8 °F (36.6 °C), Temp src:Temporal, Pulse:82, Resp:16, SpO2:99 %, Height:6' 1\" (1.854 m), Weight:200 lb (90.7 kg)     LABS:    CBC  Lab Results       Component                Value               Date/Time                  WBC                      10.0                04/13/2018 09:59 AM        HGB                      10.8 (L)            04/13/2018 09:59 AM        HCT                      32.6 (L)            04/13/2018 09:59 AM        PLT                      213                 04/13/2018 09:59 AM   RENAL  Lab Results       Component                Value               Date/Time                  NA                       141                 04/13/2018 09:59 AM        K                        3.7                 04/13/2018 09:59 AM        CL                       99                  04/13/2018 09:59 AM        CO2                      31 04/13/2018 09:59 AM        BUN                      18                  04/13/2018 09:59 AM        CREATININE               1.0                 04/13/2018 09:59 AM        GLUCOSE                  178 (H)             04/13/2018 09:59 AM   CARIE  Lab Results       Component                Value               Date/Time                  PROTIME                  12.2                04/13/2018 09:59 AM        INR                      1.08                04/13/2018 09:59 AM        APTT                     32.1                10/12/2017 11:11 AM     Intake & Output:  No intake/output data recorded. Nausea & Vomiting:  No    Level of Consciousness:  Awake    Pain Assessment:  Adequate analgesia    Anesthesia Complications:  No apparent anesthetic complications    SUMMARY      Vital signs stable  OK to discharge from Stage I post anesthesia care.   Care transferred from Anesthesiology department on discharge from perioperative area

## 2018-09-12 NOTE — ANESTHESIA PRE PROCEDURE
32.6 04/13/2018    MCV 94.1 04/13/2018    RDW 16.7 04/13/2018     04/13/2018       CMP:   Lab Results   Component Value Date     04/13/2018    K 3.7 04/13/2018    CL 99 04/13/2018    CO2 31 04/13/2018    BUN 18 04/13/2018    CREATININE 1.0 04/13/2018    GFRAA >60 04/13/2018    AGRATIO 1.8 04/13/2018    LABGLOM >60 04/13/2018    GLUCOSE 178 04/13/2018    PROT 6.1 04/13/2018    CALCIUM 8.9 04/13/2018    BILITOT 0.3 04/13/2018    ALKPHOS 56 04/13/2018    AST 13 04/13/2018    ALT 16 04/13/2018       POC Tests:   Recent Labs      09/12/18   1431   POCGLU  115*       Coags:   Lab Results   Component Value Date    PROTIME 12.2 04/13/2018    INR 1.08 04/13/2018    APTT 32.1 10/12/2017       HCG (If Applicable): No results found for: PREGTESTUR, PREGSERUM, HCG, HCGQUANT     ABGs: No results found for: PHART, PO2ART, JTN5IOD, YUT9EKN, BEART, R7UPXWTJ     Type & Screen (If Applicable):  No results found for: LABABO, LABRH    Anesthesia Evaluation    Airway: Mallampati: III  TM distance: >3 FB   Neck ROM: full  Mouth opening: > = 3 FB Dental:          Pulmonary:                              Cardiovascular:    (+) hypertension:, CAD:,                   Neuro/Psych:   (+) CVA:, TIA, psychiatric history:            GI/Hepatic/Renal:             Endo/Other:                     Abdominal:           Vascular:                                        Anesthesia Plan      general     ASA 3     (I discussed with the patient the risks and benefits of PIV, general anesthesia, IV Narcotics, PACU. All questions were answered the patient agrees with the plan.)  Induction: intravenous. Anesthetic plan and risks discussed with patient. Plan discussed with CRNA.                   Vladimir Young MD   9/12/2018

## 2018-09-12 NOTE — PROGRESS NOTES
Discharge instructions given to patient and family member verbalizes understanding.  Discharge via wheelchair in stable condition

## 2018-09-13 ENCOUNTER — TELEPHONE (OUTPATIENT)
Dept: ORTHOPEDIC SURGERY | Age: 68
End: 2018-09-13

## 2018-09-14 ENCOUNTER — TELEPHONE (OUTPATIENT)
Dept: ORTHOPEDIC SURGERY | Age: 68
End: 2018-09-14

## 2018-09-14 ENCOUNTER — TELEPHONE (OUTPATIENT)
Dept: INTERNAL MEDICINE | Age: 68
End: 2018-09-14

## 2018-09-14 NOTE — TELEPHONE ENCOUNTER
Patient is due appointment  He needs to be seen every 3 months to get this prescription.   Do not refill  He last saw me in May

## 2018-10-09 RX ORDER — AMIODARONE HYDROCHLORIDE 200 MG/1
TABLET ORAL
Qty: 30 TABLET | Refills: 5 | Status: SHIPPED | OUTPATIENT
Start: 2018-10-09

## 2018-10-10 RX ORDER — TRAZODONE HYDROCHLORIDE 50 MG/1
50 TABLET ORAL NIGHTLY
Qty: 30 TABLET | Refills: 0 | Status: SHIPPED | OUTPATIENT
Start: 2018-10-10

## 2018-10-16 ENCOUNTER — OFFICE VISIT (OUTPATIENT)
Dept: INTERNAL MEDICINE CLINIC | Age: 68
End: 2018-10-16
Payer: MEDICARE

## 2018-10-16 VITALS
OXYGEN SATURATION: 97 % | BODY MASS INDEX: 27.04 KG/M2 | DIASTOLIC BLOOD PRESSURE: 68 MMHG | HEART RATE: 56 BPM | HEIGHT: 73 IN | WEIGHT: 204 LBS | SYSTOLIC BLOOD PRESSURE: 138 MMHG

## 2018-10-16 DIAGNOSIS — F41.9 ANXIETY: ICD-10-CM

## 2018-10-16 DIAGNOSIS — E11.9 TYPE 2 DIABETES MELLITUS WITHOUT COMPLICATION, WITHOUT LONG-TERM CURRENT USE OF INSULIN (HCC): Primary | ICD-10-CM

## 2018-10-16 DIAGNOSIS — F41.1 GENERALIZED ANXIETY DISORDER: ICD-10-CM

## 2018-10-16 DIAGNOSIS — I25.10 CORONARY ARTERY DISEASE INVOLVING NATIVE CORONARY ARTERY OF NATIVE HEART WITHOUT ANGINA PECTORIS: ICD-10-CM

## 2018-10-16 DIAGNOSIS — I10 HTN (HYPERTENSION), BENIGN: ICD-10-CM

## 2018-10-16 DIAGNOSIS — I48.0 PAROXYSMAL A-FIB (HCC): ICD-10-CM

## 2018-10-16 LAB — HBA1C MFR BLD: 5.6 %

## 2018-10-16 PROCEDURE — G8598 ASA/ANTIPLAT THER USED: HCPCS | Performed by: INTERNAL MEDICINE

## 2018-10-16 PROCEDURE — 3017F COLORECTAL CA SCREEN DOC REV: CPT | Performed by: INTERNAL MEDICINE

## 2018-10-16 PROCEDURE — 99214 OFFICE O/P EST MOD 30 MIN: CPT | Performed by: INTERNAL MEDICINE

## 2018-10-16 PROCEDURE — 3044F HG A1C LEVEL LT 7.0%: CPT | Performed by: INTERNAL MEDICINE

## 2018-10-16 PROCEDURE — 1101F PT FALLS ASSESS-DOCD LE1/YR: CPT | Performed by: INTERNAL MEDICINE

## 2018-10-16 PROCEDURE — 83036 HEMOGLOBIN GLYCOSYLATED A1C: CPT | Performed by: INTERNAL MEDICINE

## 2018-10-16 PROCEDURE — 4040F PNEUMOC VAC/ADMIN/RCVD: CPT | Performed by: INTERNAL MEDICINE

## 2018-10-16 PROCEDURE — G8427 DOCREV CUR MEDS BY ELIG CLIN: HCPCS | Performed by: INTERNAL MEDICINE

## 2018-10-16 PROCEDURE — G8417 CALC BMI ABV UP PARAM F/U: HCPCS | Performed by: INTERNAL MEDICINE

## 2018-10-16 PROCEDURE — 1123F ACP DISCUSS/DSCN MKR DOCD: CPT | Performed by: INTERNAL MEDICINE

## 2018-10-16 PROCEDURE — 1036F TOBACCO NON-USER: CPT | Performed by: INTERNAL MEDICINE

## 2018-10-16 PROCEDURE — 2022F DILAT RTA XM EVC RTNOPTHY: CPT | Performed by: INTERNAL MEDICINE

## 2018-10-16 PROCEDURE — G8484 FLU IMMUNIZE NO ADMIN: HCPCS | Performed by: INTERNAL MEDICINE

## 2018-10-16 RX ORDER — ISOSORBIDE MONONITRATE 30 MG/1
30 TABLET, EXTENDED RELEASE ORAL DAILY
Qty: 30 TABLET | Refills: 3 | Status: SHIPPED | OUTPATIENT
Start: 2018-10-16

## 2018-10-16 RX ORDER — DIAZEPAM 10 MG/1
TABLET ORAL
Qty: 120 TABLET | Refills: 0 | Status: SHIPPED | OUTPATIENT
Start: 2018-10-16 | End: 2018-11-15

## 2018-10-16 ASSESSMENT — ENCOUNTER SYMPTOMS
CHEST TIGHTNESS: 0
RESPIRATORY NEGATIVE: 1
SHORTNESS OF BREATH: 0
WHEEZING: 0
GASTROINTESTINAL NEGATIVE: 1

## 2018-10-16 ASSESSMENT — PATIENT HEALTH QUESTIONNAIRE - PHQ9
2. FEELING DOWN, DEPRESSED OR HOPELESS: 0
SUM OF ALL RESPONSES TO PHQ QUESTIONS 1-9: 0
1. LITTLE INTEREST OR PLEASURE IN DOING THINGS: 0
SUM OF ALL RESPONSES TO PHQ QUESTIONS 1-9: 0
SUM OF ALL RESPONSES TO PHQ9 QUESTIONS 1 & 2: 0

## 2018-10-16 NOTE — PROGRESS NOTES
Patient Self-Management Goal for Chronic Condition  Goal: I will schedule the recommended follow up visit when leaving the office today, and agree to keep the appointment or to reschedule when I call to cancel. Barriers to success: none  Plan for overcoming my barriers: N/A     Confidence: 10/10  Date goal set: 10/16/18  Date goal attained:   Self- Management Goals for the Patient with High Cholesterol: It is important to have goals to work towards when you have High Cholesterol. Below is a list of goals your doctor would like you to work towards to help control your hyperlipidemia and also maintain and improve your overall health. Please select one of these goals to try before your next follow up visit:    Goal: I will schedule my follow-up appointment for high cholesterol at checkout desk today. I will keep the appointment or, if necessary, reschedule within a reasonable timeframe. Guess your barriers before they happen. Everyone runs into barriers to their goals. You may already know what's going to get in your way. Write down these problems (cost? time? stress? fear?), and think of ways to get around them.      Barriers to success: none  Plan for overcoming my barriers: N/A     Confidence: 9/10  Date goal set: 10/16/18  Date goal attained: